# Patient Record
Sex: FEMALE | Race: WHITE | NOT HISPANIC OR LATINO | Employment: FULL TIME | URBAN - METROPOLITAN AREA
[De-identification: names, ages, dates, MRNs, and addresses within clinical notes are randomized per-mention and may not be internally consistent; named-entity substitution may affect disease eponyms.]

---

## 2018-03-06 PROBLEM — M23.204 DEGENERATIVE TEAR OF MEDIAL MENISCUS OF LEFT KNEE: Status: ACTIVE | Noted: 2018-03-06

## 2018-03-22 ENCOUNTER — CLINICAL SUPPORT (OUTPATIENT)
Dept: REHABILITATION | Facility: HOSPITAL | Age: 53
End: 2018-03-22
Payer: COMMERCIAL

## 2018-03-22 DIAGNOSIS — M25.562 ACUTE PAIN OF LEFT KNEE: ICD-10-CM

## 2018-03-22 DIAGNOSIS — M62.81 MUSCLE WEAKNESS: ICD-10-CM

## 2018-03-22 PROCEDURE — 97110 THERAPEUTIC EXERCISES: CPT | Mod: PN

## 2018-03-22 PROCEDURE — 97161 PT EVAL LOW COMPLEX 20 MIN: CPT | Mod: PN

## 2018-03-22 PROCEDURE — 97112 NEUROMUSCULAR REEDUCATION: CPT | Mod: PN

## 2018-03-22 NOTE — PLAN OF CARE
Physical Therapy Evaluation    Name: Arlin Junior  Clinic Number: [id number]        Diagnosis:   Encounter Diagnoses   Name Primary?    Muscle weakness     Acute pain of left knee      Physician: Marce Nevarez MD  Treatment Orders: PT Eval and Treat    Past Medical History:   Diagnosis Date    Arthritis     Degenerative tear of meniscus of left knee 02/2018    History of patent ductus arteriosus as a child      Current Outpatient Prescriptions   Medication Sig    hydrocodone-acetaminophen 10-325mg (NORCO)  mg Tab Take 1 tablet by mouth every 4 (four) hours as needed (pain).    meloxicam (MOBIC) 15 MG tablet TAKE 1 TABLET(15 MG) BY MOUTH EVERY DAY    ondansetron (ZOFRAN-ODT) 4 MG TbDL Take 1 tablet (4 mg total) by mouth every 8 (eight) hours as needed.     No current facility-administered medications for this visit.      Review of patient's allergies indicates:   Allergen Reactions    Codeine Nausea And Vomiting    Neosporin [benzalkonium chloride]     Penicillins      Childhood reaction     Precautions: standard    Evaluation Date: 3/22/18        Subjective       Onset Date: L partial med menisectomy on 3/6/18.  Pt states she was having L knee pain for at least 1 yr before sx.  Pt had injections prior to sx.  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 female that presents to [facility] secondary to L knee pain.       Pain: current 4/10, worst 7/10, best 0/10, (anterior/medial L knee )  Radicular symptoms: none  Aggravating factors: prolonged standing/walking  Easing factors: ice, rest    Pts goals: return to work and exercising at the [employer]    No cultural, environmental, or spiritual barriers identified to treatment or learning.        Objective     Observation: pt is pleasant and cooperative    Posture: upright  posture      Range of Motion:   Knee Left active Right Active   Flexion 128 139   Extension 0 5 hyper ext       Lower Extremity Strength  Right LE  Left LE    Knee extension: 5/5 Knee extension: 4+/5   Knee flexion: 5/5 Knee flexion: 5/5   Hip flexion: 5/5 Hip flexion: 4+/5   Hip extension:  5/5 Hip extension: 4+/5   Hip abduction: 4/5 Hip abduction: 4/5   Hip adduction: 5/5 Hip adduction 4+/5   Ankle dorsiflexion: 5/5 Ankle dorsiflexion: 5/5       Function:  - SLS R: 8 sec  - SLS L: 3 sec    Joint Mobility:  Patellar mobility: R: WNL in all planes, L: slight decreased mobility superior/inferiorly    Sensation: grossly intact to light touch      Edema: min edema to L knee    Functional Limitations Reports - G Codes  Category: mobility  CMS Impairment/Limitation/Restriction for FOTO Knee Survey  Status Limitation G-Code CMS Severity Modifier  Intake 28% 72% Current Status CL - At least 60 percent but less than 80 percent  Predicted 54% 46% Goal Status+ CK - At least 40 percent but less than 60 percent        PT Evaluation Completed? Yes  Discussed Plan of Care with patient: Yes    TREATMENT:  Pt instructed in and performed neuromuscular re-ed x 8min (Brazilian, 10/10) while performing QS (1/2 roll under ankle and strap for gastroc stretch)    Arlin received therapeutic exercises to develop strength and endurance, flexibility for 10 minutes including:  Stationary bike x5 min level 1, seat 5  SLR x10  SL hip abd/ext x10  SL clams x10  HS stretch in sitting 3x20 sec  Pt ascended/descended 4 steps with 1 rail x3 trials    Pt rec'd gait training x 3min: pt ambulated 200 ft on striped walkway with metronome at 95bpm (vc for L heel strike and L knee ext with L stance).  Pt instructed to ambulate with metronome on her phone at home.  Pt gave verbal understanding.    Pt rec'd patella mobs (superior/inferior) x 2 min    Pt. Received cold pack x 10 min. To L knee. Following treatment.    HEP provided: pt given verbal and written  instruction for all ex listed above  Instructed pt. Regarding: Proper technique with all exercises. Pt demo good understanding of the education provided. Arlin demonstrated good return demonstration of activities.      Assessment     This is a 53 y.o. female referred to outpatient physical therapy and presents with a medical diagnosis of Degenerative tear of medial meniscus of left knee, Primary osteoarthritis of left knee and demonstrates limitations as described in the problem list. Pt will benefit from physcial therapy services in order to maximize pain free and/or functional use of left knee. The following goals were discussed with the patient and patient is in agreement with them as to be addressed in the treatment plan.     History  Co-morbidities and personal factors that may impact the plan of care Examination  Body Structures and Functions, activity limitations and participation restrictions that may impact the plan of care    Clinical Presentation   Co-morbidities:   prior ankle sx        Personal Factors:   no deficits Body Regions:   lower extremities    Body Systems:    ROM  strength  balance  gait            Participation Restrictions:   Working, prolonged standing/walking     Activity limitations:   Learning and applying knowledge  no deficits    General Tasks and Commands  no deficits    Communication  no deficits    Mobility  lifting and carrying objects  walking    Self care  no deficits    Domestic Life  shopping  cooking  doing house work (cleaning house, washing dishes, laundry)    Interactions/Relationships  no deficits    Life Areas  no deficits    Community and Social Life  community life  recreation and leisure         stable and uncomplicated                      low      Decision Making/ Complexity Score:  low         Medical necessity is demonstrated by the following IMPAIRMENTS/PROBLEM LIST:   1)Increase in L knee pain level limiting function   2)decreased L knee ROM   3)decreased LE  strength   4)gait abnormality   5)Lack of HEP    GOALS:   Short Term Goals:  4 weeks  1. Pt will report decreased L knee pain to 3/10 in order to increase tolerance for standing/walking.  2. Pt will increase L knee extension ROM to 2 deg hyperextension in order to show improved functional mobility.  3. Pt will increase L knee flexion ROM by at least 5 degrees in order to show improved functional mobility.  4. Increased L SLS balance to 5sec for increased ease with ambulation  5. Pt will be independent and consistent with issued HEP in order to show carryover between therapy sessions.  6. Pt will ambulate 300 ft without AD independently without significant gait deviation    Long Term Goals: 8 weeks   7. Pt will report decreased L knee pain to 1/10 in order to increase tolerance for standing/walking.  8. Pt will report returning to working full shifts at [employer] without significant pain.  9. Pt will increase L knee flexion ROM to 135-140 degrees in order to show improved functional mobility.  10. Increased L SLS balance to 10sec for increased ease with ambulation  11. Pt will ascend/descend 12 steps without rails independently  12. Pt will perform squat lift without pain to L knee          Plan     Pt will be treated by physical therapy 1-3 times a week for 8 weeks for Pt Education, HEP, therapeutic exercises, neuromuscular re-education, joint mobilizations, dry needling, modalities prn to achieve established goals. Arlin may at times be seen by a PTA as part of the Rehab Team.     Cont PT for  8         weeks.         I certify the need for these services furnished under this plan of treatment and while under my care.______________________________ Physician/Referring Practitioner  Date of Signature

## 2018-03-22 NOTE — PROGRESS NOTES
Physical Therapy Evaluation    Name: Arlin Junior  Clinic Number: [id number]        Diagnosis:   Encounter Diagnoses   Name Primary?    Muscle weakness     Acute pain of left knee      Physician: Marce Nevarez MD  Treatment Orders: PT Eval and Treat    Past Medical History:   Diagnosis Date    Arthritis     Degenerative tear of meniscus of left knee 02/2018    History of patent ductus arteriosus as a child      Current Outpatient Prescriptions   Medication Sig    hydrocodone-acetaminophen 10-325mg (NORCO)  mg Tab Take 1 tablet by mouth every 4 (four) hours as needed (pain).    meloxicam (MOBIC) 15 MG tablet TAKE 1 TABLET(15 MG) BY MOUTH EVERY DAY    ondansetron (ZOFRAN-ODT) 4 MG TbDL Take 1 tablet (4 mg total) by mouth every 8 (eight) hours as needed.     No current facility-administered medications for this visit.      Review of patient's allergies indicates:   Allergen Reactions    Codeine Nausea And Vomiting    Neosporin [benzalkonium chloride]     Penicillins      Childhood reaction     Precautions: standard    Evaluation Date: 3/22/18        Subjective       Onset Date: L partial med menisectomy on 3/6/18.  Pt states she was having L knee pain for at least 1 yr before sx.  Pt had injections prior to sx.  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 female that presents to [facility] secondary to L knee pain.       Pain: current 4/10, worst 7/10, best 0/10, (anterior/medial L knee )  Radicular symptoms: none  Aggravating factors: prolonged standing/walking  Easing factors: ice, rest    Pts goals: return to work and exercising at the [facility] [employer]    No cultural, environmental, or spiritual barriers identified to treatment or learning.        Objective     Observation: pt is pleasant and cooperative    Posture: upright  posture      Range of Motion:   Knee Left active Right Active   Flexion 128 139   Extension 0 5 hyper ext       Lower Extremity Strength  Right LE  Left LE    Knee extension: 5/5 Knee extension: 4+/5   Knee flexion: 5/5 Knee flexion: 5/5   Hip flexion: 5/5 Hip flexion: 4+/5   Hip extension:  5/5 Hip extension: 4+/5   Hip abduction: 4/5 Hip abduction: 4/5   Hip adduction: 5/5 Hip adduction 4+/5   Ankle dorsiflexion: 5/5 Ankle dorsiflexion: 5/5       Function:  - SLS R: 8 sec  - SLS L: 3 sec    Joint Mobility:  Patellar mobility: R: WNL in all planes, L: slight decreased mobility superior/inferiorly    Sensation: grossly intact to light touch      Edema: min edema to L knee    Functional Limitations Reports - G Codes  Category: mobility  CMS Impairment/Limitation/Restriction for FOTO Knee Survey  Status Limitation G-Code CMS Severity Modifier  Intake 28% 72% Current Status CL - At least 60 percent but less than 80 percent  Predicted 54% 46% Goal Status+ CK - At least 40 percent but less than 60 percent        PT Evaluation Completed? Yes  Discussed Plan of Care with patient: Yes    TREATMENT:  Pt instructed in and performed neuromuscular re-ed x 8min (Northern Irish, 10/10) while performing QS (1/2 roll under ankle and strap for gastroc stretch)    Arlin received therapeutic exercises to develop strength and endurance, flexibility for 10 minutes including:  Stationary bike x5 min level 1, seat 5  SLR x10  SL hip abd/ext x10  SL clams x10  HS stretch in sitting 3x20 sec  Pt ascended/descended 4 steps with 1 rail x3 trials    Pt rec'd gait training x 3min: pt ambulated 200 ft on striped walkway with metronome at 95bpm (vc for L heel strike and L knee ext with L stance).  Pt instructed to ambulate with metronome on her phone at home.  Pt gave verbal understanding.    Pt rec'd patella mobs (superior/inferior) x 2 min    Pt. Received cold pack x 10 min. To L knee. Following treatment.    HEP provided: pt given verbal and written  instruction for all ex listed above  Instructed pt. Regarding: Proper technique with all exercises. Pt demo good understanding of the education provided. Arlin demonstrated good return demonstration of activities.      Assessment     This is a 53 y.o. female referred to outpatient physical therapy and presents with a medical diagnosis of Degenerative tear of medial meniscus of left knee, Primary osteoarthritis of left knee and demonstrates limitations as described in the problem list. Pt will benefit from physcial therapy services in order to maximize pain free and/or functional use of left knee. The following goals were discussed with the patient and patient is in agreement with them as to be addressed in the treatment plan.     History  Co-morbidities and personal factors that may impact the plan of care Examination  Body Structures and Functions, activity limitations and participation restrictions that may impact the plan of care    Clinical Presentation   Co-morbidities:   prior ankle sx        Personal Factors:   no deficits Body Regions:   lower extremities    Body Systems:    ROM  strength  balance  gait            Participation Restrictions:   Working, prolonged standing/walking     Activity limitations:   Learning and applying knowledge  no deficits    General Tasks and Commands  no deficits    Communication  no deficits    Mobility  lifting and carrying objects  walking    Self care  no deficits    Domestic Life  shopping  cooking  doing house work (cleaning house, washing dishes, laundry)    Interactions/Relationships  no deficits    Life Areas  no deficits    Community and Social Life  community life  recreation and leisure         stable and uncomplicated                      low      Decision Making/ Complexity Score:  low         Medical necessity is demonstrated by the following IMPAIRMENTS/PROBLEM LIST:   1)Increase in L knee pain level limiting function   2)decreased L knee ROM   3)decreased LE  strength   4)gait abnormality   5)Lack of HEP    GOALS:   Short Term Goals:  4 weeks  1. Pt will report decreased L knee pain to 3/10 in order to increase tolerance for standing/walking.  2. Pt will increase L knee extension ROM to 2 deg hyperextension in order to show improved functional mobility.  3. Pt will increase L knee flexion ROM by at least 5 degrees in order to show improved functional mobility.  4. Increased L SLS balance to 5sec for increased ease with ambulation  5. Pt will be independent and consistent with issued HEP in order to show carryover between therapy sessions.  6. Pt will ambulate 300 ft without AD independently without significant gait deviation    Long Term Goals: 8 weeks   7. Pt will report decreased L knee pain to 1/10 in order to increase tolerance for standing/walking.  8. Pt will report returning to working full shifts at [employer] without significant pain.  9. Pt will increase L knee flexion ROM to 135-140 degrees in order to show improved functional mobility.  10. Increased L SLS balance to 10sec for increased ease with ambulation  11. Pt will ascend/descend 12 steps without rails independently  12. Pt will perform squat lift without pain to L knee          Plan     Pt will be treated by physical therapy 1-3 times a week for 8 weeks for Pt Education, HEP, therapeutic exercises, neuromuscular re-education, joint mobilizations, dry needling, modalities prn to achieve established goals. Arlin may at times be seen by a PTA as part of the Rehab Team.     Cont PT for  8         weeks.         I certify the need for these services furnished under this plan of treatment and while under my care.______________________________ Physician/Referring Practitioner  Date of Signature

## 2018-03-26 ENCOUNTER — CLINICAL SUPPORT (OUTPATIENT)
Dept: REHABILITATION | Facility: HOSPITAL | Age: 53
End: 2018-03-26
Payer: COMMERCIAL

## 2018-03-26 DIAGNOSIS — M62.81 MUSCLE WEAKNESS: ICD-10-CM

## 2018-03-26 DIAGNOSIS — M25.562 ACUTE PAIN OF LEFT KNEE: ICD-10-CM

## 2018-03-26 DIAGNOSIS — M23.204 DEGENERATIVE TEAR OF MEDIAL MENISCUS OF LEFT KNEE: ICD-10-CM

## 2018-03-26 PROCEDURE — 97110 THERAPEUTIC EXERCISES: CPT | Mod: PN

## 2018-03-26 NOTE — PATIENT INSTRUCTIONS
Heel Raises        Stand with support like your kitchen counter. Tighten pelvic floor and hold. With knees straight, raise heels off ground. Allow 2 seconds to raises heels and 2 seconds to lower heels.  Repeat 10 times for 2 sets. Do 1 times a day.  Copyright © [employer]. All rights reserved.     Quad Strength: Terminal Knee Extension With Tubing        With tubing behind involved knee or just above. Sueeze buttocks and use thigh muscles to straighten knee, pressing heel into ground. Hold 5 seconds. Relax and repeat.  Repeat 10 times or for 2 sets. Do 1-2 sessions per day.   https://[employer].SERPs/22   Copyright © [employer]. All rights reserved.     Buttocks        Lying face down, tighten front of thigh muscle. Keeping leg straight, lift leg just off floor. Hold 1 count. Slowly return to starting position.  Repeat 10 times each leg. Do 2 sets per session. Do 1 sessions per day.  Copyright © [employer]. All rights reserved.

## 2018-03-26 NOTE — PROGRESS NOTES
Name: Arlin Junior  Clinic Number: [id number]  Date of Treatment: 03/26/2018   Diagnosis:   Encounter Diagnoses   Name Primary?    Muscle weakness     Acute pain of left knee     Degenerative tear of medial meniscus of left knee        Visit number: 2  Start date: 3/22/2018  POC End date: 5/17/2018    Time in: 11:00  Time Out: 12:11  Total Treatment Time: 60  1:1 Time: 60    Precautions: L partial med menisectomy on 3/6/18    Subjective:    Arlin reports improvement of symptoms to overall to the L knee yet continues to have pain, swelling, and stiffness (worst in the mornings). Was working about 3 hours yesterday at work with mostly standing and walking, which caused increased pain and required her to take a pain pill to help her sleep last night. Pain last night was 7/10 when going to bed, 4/10 when waking up. Patient reports their current pain to be 1/10 on a 0-10 scale with 0 being no pain and 10 being the worst pain imaginable.    Objective    Arlin received therapeutic exercises to develop strength, endurance, ROM and flexibility for 55 minutes including:     Stationary bike x5 min level 2, seat 5  Sup quad set with towel under ankle 10 x 5 sec  SLR x10  SL hip abd/ext 2x10 each  SL clams 2x10 each  Prone SLR 2x10  HS stretch in sitting 3x20 sec (L)  Standing heel raises (B) 2x10  Pt ascended/descended 4 steps with 1 rail x3 trials  Fwd step up with R hip/knee flexion x 10   Gastroc stretch on incline 3x20 sec     Gait: pt ambulated 200 ft on striped walkway without metronome (vc for L heel strike and L knee ext with L stance).  Pt instructed to continue ambulating with metronome on her phone at home.  Pt gave verbal understanding.     Pt rec'd patella mobs (superior/inferior, medial), MFR to medial and lateral fat pads as well x 5 min     Pt. Received cold pack x 10 min. To L knee. Following treatment for pain and inflammation     HEP provided: pt given verbal and written instruction for additional standing heel  raises, standing TKE with green band, and prone SLR. Pt given green band for home. See in patient instructions under Notes tab. Patient also instructed to contact MD regarding possibility for limitations upon returning to work.   Instructed pt. Regarding: Proper technique with all exercises. Pt demo good understanding of the education provided. Arlin demonstrated good return demonstration of activities.    Assessment:     Arlin tolerated treatment well overall. She presented upon arrival with limited L knee extension, which improved with manual techniques and quad sets. Verbal cues throughout session with WB activity to maintain full L knee extension in stance phase. Quad and hip weakness of L LE noted. Mild onset of discomfort into L knee near lateral fat pad during ambulation, relieved with rest.  Pt will continue to benefit from skilled PT intervention to address the remaining functional deficits, maximize pt's level of independence in the home and community environment, as well as Continued inability to participate in vocational pursuits, Pain limits function of effected part for some activities, Unable to participate fully in daily activities and Weakness.    Patient is making good progress towards established goals.    Short Term Goals:  4 weeks  1. Pt will report decreased L knee pain to 3/10 in order to increase tolerance for standing/walking.  2. Pt will increase L knee extension ROM to 2 deg hyperextension in order to show improved functional mobility.  3. Pt will increase L knee flexion ROM by at least 5 degrees in order to show improved functional mobility.  4. Increased L SLS balance to 5sec for increased ease with ambulation  5. Pt will be independent and consistent with issued HEP in order to show carryover between therapy sessions.  6. Pt will ambulate 300 ft without AD independently without significant gait deviation     Long Term Goals: 8 weeks       7. Pt will report decreased L knee pain to 1/10 in  order to increase tolerance for standing/walking.  8. Pt will report returning to working full shifts at [employer] without significant pain.  9. Pt will increase L knee flexion ROM to 135-140 degrees in order to show improved functional mobility.  10. Increased L SLS balance to 10sec for increased ease with ambulation  11. Pt will ascend/descend 12 steps without rails independently  12. Pt will perform squat lift without pain to L knee    Plan:  Continue with established Plan of Care towards PT goals.

## 2018-03-28 ENCOUNTER — CLINICAL SUPPORT (OUTPATIENT)
Dept: REHABILITATION | Facility: HOSPITAL | Age: 53
End: 2018-03-28
Payer: COMMERCIAL

## 2018-03-28 DIAGNOSIS — M23.204 DEGENERATIVE TEAR OF MEDIAL MENISCUS OF LEFT KNEE: ICD-10-CM

## 2018-03-28 DIAGNOSIS — M25.562 ACUTE PAIN OF LEFT KNEE: ICD-10-CM

## 2018-03-28 DIAGNOSIS — M62.81 MUSCLE WEAKNESS: ICD-10-CM

## 2018-03-28 PROCEDURE — 97110 THERAPEUTIC EXERCISES: CPT | Mod: PN

## 2018-03-28 NOTE — PROGRESS NOTES
Name: Arlin Junior  Clinic Number: [id number]  Date of Treatment: 03/28/2018   Diagnosis:   Encounter Diagnoses   Name Primary?    Muscle weakness     Acute pain of left knee     Degenerative tear of medial meniscus of left knee          Start date: 3/22/2018  POC End date: 5/17/2018    Time in: 11:00  Time Out: 12:11  Total Treatment Time: 60  1:1 Time: 60  Visit number: 3    Precautions: L partial med menisectomy on 3/6/18    Subjective:    Arlin reports  3/10 to L knee on a 0-10 scale with 0 being no pain and 10 being the worst pain imaginable.  Pt states she's been compliant with HeP and ice.  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  Her pain is worst at night.    Objective  L knee ext: 0 deg prior to tx    Arlin received therapeutic exercises to develop strength, endurance, ROM and flexibility for 55 minutes including:   NP Stationary bike x5 min level 2, seat 5  Sup quad set with towel under ankle 10 x 5 sec  SLR x10  SL hip abd/ext 2x10 each  SL clams 2x10 each YTB  Prone hip ext 2x10  HS stretch in sitting 3x20 sec (L)  Standing heel raises (B) 2x10  Pt ascended/descended 4 steps with 1 rail x3 trials  Fwd step up with R hip/knee flexion x 10   Gastroc stretch on incline 3x20 sec  ADDED:  Hip abd walk on striped walkway Y sport loop 1 lap  Lateral step downs 4in 2x10 (slight pain to area of fat pad)  Shuttle 4 bands 2x10  2:1 shuttle 2.5 bands 2x10  Mini squats 2x10 (vc to avoid knee past toe)     Gait: pt ambulated 200 ft on striped walkway without metronome (vc for L heel strike and L knee ext with L stance).  Pt instructed to continue ambulating with metronome on her phone at home.  Pt gave verbal understanding.     Pt rec'd patella mobs (superior/inferior, medial), MFR to medial and lateral fat pads as well x 5 min     Pt. Received cold pack x 10 min. To L knee. Following treatment for pain and inflammation     HEP provided: pt instructed to continue previous HeP and given YTB to  kayla todd.  Instructed pt. Regarding: Proper technique with all exercises. Pt demo good understanding of the education provided. Arlin demonstrated good return demonstration of activities.    Assessment:     Arlin tolerated treatment well reporting 0/10 pain to L knee post tx.  She was able to tolerate increased quad strengthening intensity.  She did report some pain to area of L knee fat pad with lateral step downs, which was relieved with rest and MFR to fat pads.  She presented with good L knee extension, but would like to see her regain some hyperextension for symmetry to R knee.  Pt will continue to benefit from skilled PT intervention to address the remaining functional deficits, maximize pt's level of independence in the home and community environment, as well as Continued inability to participate in vocational pursuits, Pain limits function of effected part for some activities, Unable to participate fully in daily activities and Weakness.    Patient is making good progress towards established goals.    Short Term Goals:  4 weeks  1. Pt will report decreased L knee pain to 3/10 in order to increase tolerance for standing/walking.  2. Pt will increase L knee extension ROM to 2 deg hyperextension in order to show improved functional mobility.  3. Pt will increase L knee flexion ROM by at least 5 degrees in order to show improved functional mobility.  4. Increased L SLS balance to 5sec for increased ease with ambulation  5. Pt will be independent and consistent with issued HEP in order to show carryover between therapy sessions.  6. Pt will ambulate 300 ft without AD independently without significant gait deviation     Long Term Goals: 8 weeks       7. Pt will report decreased L knee pain to 1/10 in order to increase tolerance for standing/walking.  8. Pt will report returning to working full shifts at [employer] without significant pain.  9. Pt will increase L knee flexion ROM to 135-140 degrees in  order to show improved functional mobility.  10. Increased L SLS balance to 10sec for increased ease with ambulation  11. Pt will ascend/descend 12 steps without rails independently  12. Pt will perform squat lift without pain to L knee    Plan:  Continue with established Plan of Care towards PT goals.

## 2018-04-02 ENCOUNTER — CLINICAL SUPPORT (OUTPATIENT)
Dept: REHABILITATION | Facility: HOSPITAL | Age: 53
End: 2018-04-02
Payer: COMMERCIAL

## 2018-04-02 DIAGNOSIS — M62.81 MUSCLE WEAKNESS: ICD-10-CM

## 2018-04-02 DIAGNOSIS — M23.204 DEGENERATIVE TEAR OF MEDIAL MENISCUS OF LEFT KNEE: ICD-10-CM

## 2018-04-02 DIAGNOSIS — M25.562 ACUTE PAIN OF LEFT KNEE: ICD-10-CM

## 2018-04-02 PROCEDURE — 97110 THERAPEUTIC EXERCISES: CPT | Mod: PN

## 2018-04-02 NOTE — PROGRESS NOTES
Name: Arlin Junior  Clinic Number: [id number]  Date of Treatment: 04/02/2018   Diagnosis:   Encounter Diagnoses   Name Primary?    Muscle weakness     Acute pain of left knee     Degenerative tear of medial meniscus of left knee          Start date: 3/22/2018  POC End date: 5/17/2018    Time in: 11:03  Time Out: 12:10  Total Treatment Time: 55  1:1 Time: 55  Visit number: 4    Precautions: L partial med menisectomy on 3/6/18    Subjective:    Arlin reports having increased pain and muscle soreness following previous PT session. Had some soreness this weekend, but it improved with rest. Has been compliant with her home exercises as well as icing her L knee at least 3 times a day. She reports 2/10 to L knee currently on a 0-10 scale with 0 being no pain and 10 being the worst pain imaginable.     Objective  L knee ext: 0 deg prior to tx    Arlin received therapeutic exercises to develop strength, endurance, ROM and flexibility for 55 minutes including:   NP Stationary bike x5 min level 2, seat 5  Sup quad set with towel under ankle 10 x 5 sec  SLR 2x10  SL hip abd/ext 2x10 each  SL clams 2x10 each YTB  Prone hip ext 2x10  HS stretch in sitting 3x20 sec (L)  Standing heel raises (B) 2x10  Pt ascended/descended 4 steps with 0 rail x3 trials  Fwd step up with R hip/knee flexion 2 x 10   Gastroc stretch on incline 3x20 sec  Hip abd walk on striped walkway Y sport loop 1 lap  Lateral step downs Bottom step x10 (vc to prevent valgus, which eliminates pain into fat pad)  Shuttle 4 bands 2x10  2:1 shuttle 2.5 bands 2x10  Mini squats 2x10 (vc to avoid knee past toe)     Gait: pt ambulated 100 ft (vc for slower pace, L heel strike and L knee ext with L stance).  Pt instructed to continue ambulating with metronome on her phone at home.  Pt gave verbal understanding.     Pt rec'd patella mobs (superior/inferior, medial), MFR to medial and lateral fat pads as well x 5 min     Pt. Received cold pack x 10 min to L knee. Following  treatment for pain and inflammation     HEP provided: pt instructed to continue previous HeP and given YTB to progress clams.  Instructed pt. Regarding: Proper technique with all exercises. Pt demo good understanding of the education provided. Arlin demonstrated good return demonstration of activities.    Assessment:     Arlin demonstrated overall good tolerance to treatment this date without increased pain. Mild distal ITB tightness palpated with manual techniques, relief after manual tx. Improved stability with shuttle press, forward and lateral step downs, and mini squats. Verbal cues required to maintain body mechanics and avoid knees over toes as well as slight valgus. She reported 0-1/10 pain to L knee post tx.   Pt will continue to benefit from skilled PT intervention to address the remaining functional deficits, maximize pt's level of independence in the home and community environment, as well as Continued inability to participate in vocational pursuits, Pain limits function of effected part for some activities, Unable to participate fully in daily activities and Weakness.    Patient is making good progress towards established goals.    Short Term Goals:  4 weeks  1. Pt will report decreased L knee pain to 3/10 in order to increase tolerance for standing/walking.  2. Pt will increase L knee extension ROM to 2 deg hyperextension in order to show improved functional mobility.  3. Pt will increase L knee flexion ROM by at least 5 degrees in order to show improved functional mobility.  4. Increased L SLS balance to 5sec for increased ease with ambulation  5. Pt will be independent and consistent with issued HEP in order to show carryover between therapy sessions.  6. Pt will ambulate 300 ft without AD independently without significant gait deviation     Long Term Goals: 8 weeks       7. Pt will report decreased L knee pain to 1/10 in order to increase tolerance for standing/walking.  8. Pt will report returning to  working full shifts at [employer] without significant pain.  9. Pt will increase L knee flexion ROM to 135-140 degrees in order to show improved functional mobility.  10. Increased L SLS balance to 10sec for increased ease with ambulation  11. Pt will ascend/descend 12 steps without rails independently  12. Pt will perform squat lift without pain to L knee    Plan:  Continue with established Plan of Care towards PT goals.

## 2018-04-05 ENCOUNTER — CLINICAL SUPPORT (OUTPATIENT)
Dept: REHABILITATION | Facility: HOSPITAL | Age: 53
End: 2018-04-05
Payer: COMMERCIAL

## 2018-04-05 DIAGNOSIS — M25.562 ACUTE PAIN OF LEFT KNEE: ICD-10-CM

## 2018-04-05 DIAGNOSIS — M23.204 DEGENERATIVE TEAR OF MEDIAL MENISCUS OF LEFT KNEE: ICD-10-CM

## 2018-04-05 DIAGNOSIS — M62.81 MUSCLE WEAKNESS: ICD-10-CM

## 2018-04-05 PROCEDURE — 97110 THERAPEUTIC EXERCISES: CPT | Mod: PN

## 2018-04-05 NOTE — PROGRESS NOTES
Name: Arlin Junior  Clinic Number: [id number]  Date of Treatment: 04/05/2018   Diagnosis:   Encounter Diagnoses   Name Primary?    Muscle weakness     Acute pain of left knee     Degenerative tear of medial meniscus of left knee          Start date: 3/22/2018  POC End date: 5/17/2018    Time in: 1:00  Time Out: 2:10  Total Treatment Time: 55  1:1 Time: 55  Visit number: 5    Precautions: L partial med menisectomy on 3/6/18    Subjective:    Arlin reports having increased pain and muscle soreness following previous PT session. . She reports 3/10 to L knee currently on a 0-10 scale with 0 being no pain and 10 being the worst pain imaginable.     Objective  L knee ext: 0 deg prior to tx    Arlin received therapeutic exercises to develop strength, endurance, ROM and flexibility for 55 minutes including:   NP Stationary bike x5 min level 2, seat 5  Seated quad set with 1/2 roll under ankle and strap for gastroc stretch 20 x 5 sec  SLR 2x10  SL hip abd/ext 2x10 each NP  SL clams 2x10 each YTB  Prone hip ext 2x10  HS stretch in sitting 3x20 sec (L) NP  Standing heel raises (B) 2x10  Pt ascended/descended 4 steps with 0 rail x3 trials  Fwd step up with R hip/knee flexion 6in 2 x 10   Gastroc stretch on incline 3x20 sec  Hip abd walk on striped walkway Y sport loop 1 lap  Lateral step downs 4in step x10 pt reporting pain to medial fat pad even with cues to avoid valgus  Shuttle 4 bands 2x10 (Y sport loop for hip abd iso)  2:1 shuttle 3 bands 2x10  Mini squats 2x10 with 1# dowel overhead and stool in front of her to prevent knees past toes     Gait: pt ambulated 100 ft (vc for slower pace, L heel strike and L knee ext with L stance).    Pt ascended/descended 4 steps with 1 rail, reciprocal pattern mod I x4 trials without pain     Pt rec'd patella mobs (superior/inferior, medial), MFR to medial and lateral fat pads as well x 5 min     Pt. Received cold pack x 10 min to L knee. Following treatment for pain and  inflammation     HEP provided: pt instructed to continue previous HeP.  Instructed pt. Regarding: Proper technique with all exercises. Pt demo good understanding of the education provided. Arlin demonstrated good return demonstration of activities.    Assessment:     Arlin tolerated tx session well reporting 0/10 pain to L knee post tx.  She continues to progress with increased strength as noted by performing shuttle with increased resistance and without pain.  Pt continues to have difficulty with lateral step downs due to decreased eccentric quad control, but she was able to tolerate increased 2:1 on shuttle without pain.  Pt will continue to benefit from skilled PT intervention to address the remaining functional deficits, maximize pt's level of independence in the home and community environment, as well as Continued inability to participate in vocational pursuits, Pain limits function of effected part for some activities, Unable to participate fully in daily activities and Weakness.    Patient is making good progress towards established goals.    Short Term Goals:  4 weeks  1. Pt will report decreased L knee pain to 3/10 in order to increase tolerance for standing/walking.  2. Pt will increase L knee extension ROM to 2 deg hyperextension in order to show improved functional mobility.  3. Pt will increase L knee flexion ROM by at least 5 degrees in order to show improved functional mobility.  4. Increased L SLS balance to 5sec for increased ease with ambulation  5. Pt will be independent and consistent with issued HEP in order to show carryover between therapy sessions.  6. Pt will ambulate 300 ft without AD independently without significant gait deviation     Long Term Goals: 8 weeks       7. Pt will report decreased L knee pain to 1/10 in order to increase tolerance for standing/walking.  8. Pt will report returning to working full shifts at [employer] without significant pain.  9. Pt will increase L knee  flexion ROM to 135-140 degrees in order to show improved functional mobility.  10. Increased L SLS balance to 10sec for increased ease with ambulation  11. Pt will ascend/descend 12 steps without rails independently  12. Pt will perform squat lift without pain to L knee    Plan:  Continue with established Plan of Care towards PT goals.

## 2018-04-09 ENCOUNTER — CLINICAL SUPPORT (OUTPATIENT)
Dept: REHABILITATION | Facility: HOSPITAL | Age: 53
End: 2018-04-09
Payer: COMMERCIAL

## 2018-04-09 DIAGNOSIS — M23.204 DEGENERATIVE TEAR OF MEDIAL MENISCUS OF LEFT KNEE: ICD-10-CM

## 2018-04-09 DIAGNOSIS — M25.562 ACUTE PAIN OF LEFT KNEE: ICD-10-CM

## 2018-04-09 DIAGNOSIS — M62.81 MUSCLE WEAKNESS: ICD-10-CM

## 2018-04-09 PROCEDURE — 97110 THERAPEUTIC EXERCISES: CPT | Mod: PN

## 2018-04-09 NOTE — PROGRESS NOTES
Name: Arlin Junior  Clinic Number: [id number]  Date of Treatment: 04/09/2018   Diagnosis:   Encounter Diagnoses   Name Primary?    Muscle weakness     Acute pain of left knee     Degenerative tear of medial meniscus of left knee          Start date: 3/22/2018  POC End date: 5/17/2018    Time in: 12:00  Time Out: 1:00  Total Treatment Time: 55  1:1 Time: 55  Visit number: 6    Precautions: L partial med menisectomy on 3/6/18    Subjective:    She reports 2/10 to L knee currently on a 0-10 scale with 0 being no pain and 10 being the worst pain imaginable. She states she goes back to work today.    Objective  L knee ext: 0 deg prior to tx    Arlin  Instructed in and perfomed therapeutic exercises to develop strength, endurance, ROM and flexibility for 55 minutes including:   NP Stationary bike x5 min level 2, seat 5  Seated quad set with 1/2 roll under ankle and strap for gastroc stretch 20 x 5 sec  SLR 2x10 1#  SL hip abd/ext 2x10 each NP  SL clams 2x10 each Y sport loop  Prone hip ext 2x10  HS stretch in sitting 3x20 sec (L) NP  Standing heel raises (SL) 2x10  Pt ascended/descended 4 steps with 0 rail x3 trials NP  Fwd step up with R hip/knee flexion 6in 2 x 10   Gastroc stretch on incline 3x20 sec  Hip abd walk on striped walkway Y sport loop 1 lap  Lateral step downs 4in step 2x10   Shuttle 4 bands 2x10 (Y sport loop for hip abd iso)  2:1 shuttle 3 bands 2x10  Mini squats 2x10 with 1# dowel overhead and stool in front of her to prevent knees past toes   ADDED:  Donkey kicks x10 ea    Gait: pt ambulated on striped walkway for 3 min with metronome on 92bpm (vc for L heel strike and equal stance time--improved with metronome)     Pt rec'd patella mobs (superior/inferior, medial), MFR to medial and lateral fat pads as well x 5 min     Pt. Received cold pack x 10 min to L knee. Following treatment for pain and inflammation     HEP provided: pt instructed to continue previous HeP.  Instructed pt. Regarding: Proper  technique with all exercises. Pt demo good understanding of the education provided. Arlin demonstrated good return demonstration of activities.    Assessment:     Arlin tolerated tx session well reporting 0/10 pain to L knee post tx.  Pt was able to tolerate increased reps for lateral step downs with improve eccentric quad control noted.  Pt will continue to benefit from skilled PT intervention to address the remaining functional deficits, maximize pt's level of independence in the home and community environment, as well as Continued inability to participate in vocational pursuits, Pain limits function of effected part for some activities, Unable to participate fully in daily activities and Weakness.    Patient is making good progress towards established goals.    Short Term Goals:  4 weeks  1. Pt will report decreased L knee pain to 3/10 in order to increase tolerance for standing/walking.  2. Pt will increase L knee extension ROM to 2 deg hyperextension in order to show improved functional mobility.  3. Pt will increase L knee flexion ROM by at least 5 degrees in order to show improved functional mobility.  4. Increased L SLS balance to 5sec for increased ease with ambulation  5. Pt will be independent and consistent with issued HEP in order to show carryover between therapy sessions.  6. Pt will ambulate 300 ft without AD independently without significant gait deviation     Long Term Goals: 8 weeks       7. Pt will report decreased L knee pain to 1/10 in order to increase tolerance for standing/walking.  8. Pt will report returning to working full shifts at [employer] without significant pain.  9. Pt will increase L knee flexion ROM to 135-140 degrees in order to show improved functional mobility.  10. Increased L SLS balance to 10sec for increased ease with ambulation  11. Pt will ascend/descend 12 steps without rails independently  12. Pt will perform squat lift without pain to L knee    Plan:  Continue  with established Plan of Care towards PT goals.

## 2018-04-12 ENCOUNTER — TELEPHONE (OUTPATIENT)
Dept: REHABILITATION | Facility: HOSPITAL | Age: 53
End: 2018-04-12

## 2018-04-23 ENCOUNTER — CLINICAL SUPPORT (OUTPATIENT)
Dept: REHABILITATION | Facility: HOSPITAL | Age: 53
End: 2018-04-23
Payer: COMMERCIAL

## 2018-04-23 DIAGNOSIS — M23.204 DEGENERATIVE TEAR OF MEDIAL MENISCUS OF LEFT KNEE: ICD-10-CM

## 2018-04-23 DIAGNOSIS — M25.562 ACUTE PAIN OF LEFT KNEE: ICD-10-CM

## 2018-04-23 DIAGNOSIS — M62.81 MUSCLE WEAKNESS: ICD-10-CM

## 2018-04-23 PROCEDURE — 97110 THERAPEUTIC EXERCISES: CPT | Mod: PN

## 2018-04-23 NOTE — PROGRESS NOTES
Name: Arlin Junior  Clinic Number: [id number]  Date of Treatment: 04/23/2018   Diagnosis:   Encounter Diagnoses   Name Primary?    Muscle weakness     Acute pain of left knee     Degenerative tear of medial meniscus of left knee          Start date: 3/22/2018  POC End date: 5/17/2018    Time in: 12:00  Time Out: 1:00  Total Treatment Time: 55  1:1 Time: 45  Visit number: 7    Precautions: L partial med menisectomy on 3/6/18    Subjective:    She reports 0/10 to L knee currently on a 0-10 scale with 0 being no pain and 10 being the worst pain imaginable. She states her L knee pain is 3-4/10 with prolonged working.  She is still taking breaks to ice L knee at work as needed.    Objective  Pocket of edema superior to patella, no redness or signs of infection  L knee ext: 0 deg prior to tx    Range of Motion:   Knee Left active Right Active   Flexion 130 (132 post tx) 139   Extension 1 hyperext (2 hyperext post tx) 5 hyperext       Lower Extremity Strength  Right LE  Left LE    Knee extension: 5/5 Knee extension: 5/5   Knee flexion: 5/5 Knee flexion: 5/5   Hip flexion: 5/5 Hip flexion: 4+/5   Hip extension:  5/5 Hip extension: 5/5   Hip abduction: 4+/5 Hip abduction: 4+/5   Hip adduction: 5/5 Hip adduction 4+/5   Ankle dorsiflexion: 5/5 Ankle dorsiflexion: 5/5       Function:  - SLS R: 16 sec  - SLS L: 14 sec      Arlin  Instructed in and perfomed therapeutic exercises to develop strength, endurance, ROM and flexibility for 55 minutes including:   NP Stationary bike x5 min level 2, seat 5  Seated quad set with 1/2 roll under ankle and strap for gastroc stretch 20 x 5 sec  SLR 2x10 1#  SL hip abd/ext 2x10 each NP  SL clams 2x10 each Y sport loop  Prone hip ext 2x10  NP HS stretch in sitting 3x20 sec (L)   Standing heel raises (SL) 2x10  Pt ascended/descended 4 steps with 1 rail x4 trials without pain  Fwd step up with R hip/knee flexion 6in 2 x 10   Gastroc stretch on incline 3x20 sec  NP Hip abd walk on striped walkway Y  sport loop 1 lap  Lateral step downs 4in step 2x8  Shuttle 4 bands 2x10 (Y sport loop for hip abd iso)  2:1 shuttle 3 bands 2x10  Mini squats 2x10 with manual cue of 18in box + foam   Donkey kicks x10 ea     Pt rec'd patella mobs (superior/inferior, medial) x 5 min     Pt. Received cold pack x 10 min to L knee. Following treatment for pain and inflammation     HEP provided: pt instructed to continue previous HeP.  Instructed pt. Regarding: Proper technique with all exercises. Pt demo good understanding of the education provided. Arlin demonstrated good return demonstration of activities.    Assessment:     Arlin tolerated tx session well reporting 0/10 pain to L knee post tx.  Pt with report of pain lateral and inferior to patella with lateral step downs, but improved with decreasing height to 4in.  She is improving with increased L knee ROM and increased L LE strength despite having missed PT visits due to L knee infection.  Infection cleared with antibiotics.  Pt will continue to benefit from skilled PT intervention to address the remaining functional deficits, maximize pt's level of independence in the home and community environment, as well as Continued inability to participate in vocational pursuits, Pain limits function of effected part for some activities, Unable to participate fully in daily activities and Weakness.    Patient is making good progress towards established goals.    Short Term Goals:  4 weeks  1. Pt will report decreased L knee pain to 3/10 in order to increase tolerance for standing/walking.  2. Pt will increase L knee extension ROM to 2 deg hyperextension in order to show improved functional mobility. (progressing)  3. Pt will increase L knee flexion ROM by at least 5 degrees in order to show improved functional mobility. (progressing)  4. Increased L SLS balance to 5sec for increased ease with ambulation (met)  5. Pt will be independent and consistent with issued HEP in order to show carryover  between therapy sessions. (met)  6. Pt will ambulate 300 ft without AD independently without significant gait deviation (met)     Long Term Goals: 8 weeks       7. Pt will report decreased L knee pain to 1/10 in order to increase tolerance for standing/walking.  8. Pt will report returning to working full shifts at [employer] without significant pain.  9. Pt will increase L knee flexion ROM to 135-140 degrees in order to show improved functional mobility.  10. Increased L SLS balance to 10sec for increased ease with ambulation  11. Pt will ascend/descend 12 steps without rails independently  12. Pt will perform squat lift without pain to L knee    Unmet goals continue to remain appropriate within 8 weeks.    Plan:  Continue with established Plan of Care towards PT goals.

## 2018-04-26 ENCOUNTER — CLINICAL SUPPORT (OUTPATIENT)
Dept: REHABILITATION | Facility: HOSPITAL | Age: 53
End: 2018-04-26
Payer: COMMERCIAL

## 2018-04-26 DIAGNOSIS — M23.204 DEGENERATIVE TEAR OF MEDIAL MENISCUS OF LEFT KNEE: ICD-10-CM

## 2018-04-26 DIAGNOSIS — M25.562 ACUTE PAIN OF LEFT KNEE: ICD-10-CM

## 2018-04-26 DIAGNOSIS — M62.81 MUSCLE WEAKNESS: ICD-10-CM

## 2018-04-26 PROCEDURE — 97110 THERAPEUTIC EXERCISES: CPT | Mod: PN

## 2018-04-26 NOTE — PROGRESS NOTES
Name: Arlin Junior  Clinic Number: [id number]  Date of Treatment: 04/26/2018   Diagnosis:   Encounter Diagnoses   Name Primary?    Muscle weakness     Acute pain of left knee     Degenerative tear of medial meniscus of left knee          Start date: 3/22/2018  POC End date: 5/17/2018    Time in: 2:00  Time Out: 3:00  Total Treatment Time: 55  1:1 Time: 45  Visit number: 9    Precautions: L partial med menisectomy on 3/6/18    Subjective:    She reports 0/10 to L knee currently on a 0-10 scale with 0 being no pain and 10 being the worst pain imaginable. She states her L knee pain is 4/10 with prolonged working.  She states she was on her feet for 5 hrs straight yesterday at work with increased L knee pain which lingered.    Objective  Pocket of edema superior to patella, no redness or signs of infection  Prior to tx: L knee ext: 1 hyperext deg prior to tx, 130 deg flex      Arlin  Instructed in and perfomed therapeutic exercises to develop strength, endurance, ROM and flexibility for 55 minutes including:   NP Stationary bike x5 min level 2, seat 5  Seated quad set with 1/2 roll under ankle and strap for gastroc stretch 20 x 5 sec  SLR 2x10 1#  SL hip abd/ext 2x10 each NP  SL clams 2x10 each G sport loop  NP Prone hip ext 2x10  NP HS stretch in sitting 3x20 sec (L)   NP Standing heel raises (SL) 2x10  Fwd step up with R hip/knee flexion 6in 2 x 10   Gastroc stretch on incline 3x20 sec  Hip abd walk on striped walkway G sport loop 1 lap  Lateral step downs 4in step 2x10  Shuttle 4 bands 3x10 (G sport loop for hip abd iso)  2:1 shuttle 3 bands 3x10  NP Mini squats 2x10 with manual cue of 18in box + foam   ADDED:  Bridges with G sport loop for hip abd iso 2x10  pilates stepper (2 bands L4) 2x10  Pt rec'd patella mobs (superior/inferior, medial) x 5 min     Pt. Received cold pack x 10 min to L knee. Following treatment for pain and inflammation     HEP provided: pt instructed to continue previous HeP.  Instructed pt.  Regarding: Proper technique with all exercises. Pt demo good understanding of the education provided. Arlin demonstrated good return demonstration of activities.    Assessment:     Arlin tolerated tx session well reporting 0/10 pain to L knee post tx.  Pt continues to require occasional cues to avoid L knee valgus with ex.  Improved loading of L LE with 2:1 on shuttle, still slight pain noted with knee extension phase of lateral step downs.  Pt will continue to benefit from skilled PT intervention to address the remaining functional deficits, maximize pt's level of independence in the home and community environment, as well as Continued inability to participate in vocational pursuits, Pain limits function of effected part for some activities, Unable to participate fully in daily activities and Weakness.    Patient is making good progress towards established goals.    Short Term Goals:  4 weeks  1. Pt will report decreased L knee pain to 3/10 in order to increase tolerance for standing/walking.  2. Pt will increase L knee extension ROM to 2 deg hyperextension in order to show improved functional mobility. (progressing)  3. Pt will increase L knee flexion ROM by at least 5 degrees in order to show improved functional mobility. (progressing)  4. Increased L SLS balance to 5sec for increased ease with ambulation (met)  5. Pt will be independent and consistent with issued HEP in order to show carryover between therapy sessions. (met)  6. Pt will ambulate 300 ft without AD independently without significant gait deviation (met)     Long Term Goals: 8 weeks       7. Pt will report decreased L knee pain to 1/10 in order to increase tolerance for standing/walking.  8. Pt will report returning to working full shifts at [employer] without significant pain.  9. Pt will increase L knee flexion ROM to 135-140 degrees in order to show improved functional mobility.  10. Increased L SLS balance to 10sec for increased ease with  ambulation  11. Pt will ascend/descend 12 steps without rails independently  12. Pt will perform squat lift without pain to L knee      Plan:  Continue with established Plan of Care towards PT goals.

## 2018-04-30 ENCOUNTER — CLINICAL SUPPORT (OUTPATIENT)
Dept: REHABILITATION | Facility: HOSPITAL | Age: 53
End: 2018-04-30
Payer: COMMERCIAL

## 2018-04-30 DIAGNOSIS — M25.562 ACUTE PAIN OF LEFT KNEE: ICD-10-CM

## 2018-04-30 DIAGNOSIS — M23.204 DEGENERATIVE TEAR OF MEDIAL MENISCUS OF LEFT KNEE: ICD-10-CM

## 2018-04-30 DIAGNOSIS — M62.81 MUSCLE WEAKNESS: ICD-10-CM

## 2018-04-30 PROCEDURE — 97110 THERAPEUTIC EXERCISES: CPT | Mod: PN

## 2018-04-30 NOTE — PROGRESS NOTES
Name: Arlin Junior  Clinic Number: [id number]  Date of Treatment: 04/30/2018   Diagnosis:   Encounter Diagnoses   Name Primary?    Muscle weakness     Acute pain of left knee     Degenerative tear of medial meniscus of left knee          Start date: 3/22/2018  POC End date: 5/17/2018    Start Time with PTA: 2:30  Time Out: 3:12  Total Treatment Time: 30  1:1 Time: 30  Visit number: 10    Precautions: L partial med menisectomy on 3/6/18    Subjective:    See PT note.     Objective  Pocket of edema superior to patella, no redness or signs of infection    Arlin  Instructed in and perfomed therapeutic exercises to develop strength, endurance, ROM and flexibility for 25 minutes including:   Stationary bike x 5 min, level 4, seat 5  Fwd step up with R hip/knee flexion 6in 2 x 10 (attempted but held due to onset of L medial knee pain)  Hip abd walk on striped walkway Y sport loop 1 lap (attempted but HELD due to L medial knee pain)  NP - Lateral step downs 4in step 2x10  Shuttle 4 bands 3x10 (G sport loop for hip abd iso)  2:1 shuttle 3 bands 3x10  Mini squats x10 with UE assist  pilates stepper (2 bands L4 - no dowel) 2x10 (2 in step to reach pedal)     Pt. Received cold pack x 10 min to L knee. Following treatment for pain and inflammation     HEP provided: pt instructed to continue previous HeP.  Instructed pt. Regarding: Proper technique with all exercises. Pt demo good understanding of the education provided. Arlin demonstrated good return demonstration of activities.    Assessment:     Arlin tolerated tx session well reporting 0/10 pain to L knee post tx. Continued onset of L medial knee pain with concentric knee extension in WB with forward step ups and lateral step downs were therefore held. Able to perform shuttle press, upright bike, and min squats without onset of L knee pain.   Pt will continue to benefit from skilled PT intervention to address the remaining functional deficits, maximize pt's level of independence  in the home and community environment, as well as Continued inability to participate in vocational pursuits, Pain limits function of effected part for some activities, Unable to participate fully in daily activities and Weakness.    Patient is making good progress towards established goals.    Short Term Goals:  4 weeks  1. Pt will report decreased L knee pain to 3/10 in order to increase tolerance for standing/walking.  2. Pt will increase L knee extension ROM to 2 deg hyperextension in order to show improved functional mobility. (progressing)  3. Pt will increase L knee flexion ROM by at least 5 degrees in order to show improved functional mobility. (progressing)  4. Increased L SLS balance to 5sec for increased ease with ambulation (met)  5. Pt will be independent and consistent with issued HEP in order to show carryover between therapy sessions. (met)  6. Pt will ambulate 300 ft without AD independently without significant gait deviation (met)     Long Term Goals: 8 weeks       7. Pt will report decreased L knee pain to 1/10 in order to increase tolerance for standing/walking.  8. Pt will report returning to working full shifts at [employer] without significant pain.  9. Pt will increase L knee flexion ROM to 135-140 degrees in order to show improved functional mobility.  10. Increased L SLS balance to 10sec for increased ease with ambulation  11. Pt will ascend/descend 12 steps without rails independently  12. Pt will perform squat lift without pain to L knee      Plan:  Continue with established Plan of Care towards PT goals.

## 2018-04-30 NOTE — PROGRESS NOTES
Name: Arlin Junior  Clinic Number: [id number]  Date of Treatment: 04/30/2018   Diagnosis:   No diagnosis found.      Start date: 3/22/2018  POC End date: 5/17/2018    Time in: 2:00  Time Out: 3:00  Total Treatment Time: 55  1:1 Time: 45  Visit number: 9    Precautions: L partial med menisectomy on 3/6/18    Subjective:    She reports 4-5/10 to L knee currently on a 0-10 scale with 0 being no pain and 10 being the worst pain imaginable.   She states even when she ices L knee 3x/day at work she only gets pain relief for 30-40min after icing L knee before it's back to 5/10 pain again    Objective  Pocket of edema superior to patella, no redness or signs of infection  Prior to tx: L knee ext: 1 hyperext deg prior to tx, 124 deg flex      Arlin  Instructed in and perfomed therapeutic exercises to develop strength, endurance, ROM and flexibility for 55 minutes including:   NP Stationary bike x5 min level 2, seat 5  Seated quad set with 1/2 roll under ankle and strap for gastroc stretch 20 x 5 sec  SLR 2x10 2#  SL hip abd/ext 2x10 each NP  SL clams 2x10 each G sport loop  Gastroc stretch on incline 3x20 sec      Pt seen by PTA for remainder of visit     Pt. Received cold pack x 10 min to L knee. Following treatment for pain and inflammation     HEP provided: pt instructed to continue previous HeP.  Instructed pt. Regarding: Proper technique with all exercises. Pt demo good understanding of the education provided. Arlin demonstrated good return demonstration of activities.    Assessment:     Pt without c/o increased L knee pain with mat exercises.  She presented with increased overall L knee edema.  She does not have redness or fever to L knee.  Please see PTA note for remainder of session.  Pt will continue to benefit from skilled PT intervention to address the remaining functional deficits, maximize pt's level of independence in the home and community environment, as well as Continued inability to participate in vocational  pursuits, Pain limits function of effected part for some activities, Unable to participate fully in daily activities and Weakness.    Patient is making good progress towards established goals.    Short Term Goals:  4 weeks  1. Pt will report decreased L knee pain to 3/10 in order to increase tolerance for standing/walking.  2. Pt will increase L knee extension ROM to 2 deg hyperextension in order to show improved functional mobility. (progressing)  3. Pt will increase L knee flexion ROM by at least 5 degrees in order to show improved functional mobility. (progressing)  4. Increased L SLS balance to 5sec for increased ease with ambulation (met)  5. Pt will be independent and consistent with issued HEP in order to show carryover between therapy sessions. (met)  6. Pt will ambulate 300 ft without AD independently without significant gait deviation (met)     Long Term Goals: 8 weeks       7. Pt will report decreased L knee pain to 1/10 in order to increase tolerance for standing/walking.  8. Pt will report returning to working full shifts at [employer] without significant pain.  9. Pt will increase L knee flexion ROM to 135-140 degrees in order to show improved functional mobility.  10. Increased L SLS balance to 10sec for increased ease with ambulation  11. Pt will ascend/descend 12 steps without rails independently  12. Pt will perform squat lift without pain to L knee      Plan:  Continue with established Plan of Care towards PT goals.

## 2018-05-03 ENCOUNTER — CLINICAL SUPPORT (OUTPATIENT)
Dept: REHABILITATION | Facility: HOSPITAL | Age: 53
End: 2018-05-03
Payer: COMMERCIAL

## 2018-05-03 DIAGNOSIS — M23.204 DEGENERATIVE TEAR OF MEDIAL MENISCUS OF LEFT KNEE: ICD-10-CM

## 2018-05-03 DIAGNOSIS — M62.81 MUSCLE WEAKNESS: ICD-10-CM

## 2018-05-03 DIAGNOSIS — M25.562 ACUTE PAIN OF LEFT KNEE: ICD-10-CM

## 2018-05-03 PROCEDURE — 97110 THERAPEUTIC EXERCISES: CPT | Mod: PN

## 2018-05-03 PROCEDURE — 97014 ELECTRIC STIMULATION THERAPY: CPT | Mod: PN

## 2018-05-03 NOTE — PROGRESS NOTES
Name: Arlin Junior  Clinic Number: [id number]  Date of Treatment: 05/03/2018   Diagnosis:   Encounter Diagnoses   Name Primary?    Muscle weakness     Acute pain of left knee     Degenerative tear of medial meniscus of left knee          Start date: 3/22/2018  POC End date: 5/17/2018    Time in: 3:00  Time Out: 4:00  Total Treatment Time: 45  1:1 Time: 45  Visit number: 11    Precautions: L partial med menisectomy on 3/6/18    Subjective: pt reports 4-5/10 pain to L knee presently.  She states she starts having increased pain and swelling to L knee after 1-2 hrs of working.  She states she only got 10min of pain relief to L knee after ice post tx last visit.  Pt states she's been taking 1 Meloxicam and 2 Aleve to minimize pain and inflammation.  She states she has difficulty sleeping at night due to L knee pain      Objective  Pocket of edema superior to patella, no redness or signs of infection  Knee jt line girth:  L: 52 cm  R:48.2 cm    Decreased L LE stance during gait due to L knee pain.    Arlin  Instructed in and perfomed therapeutic exercises to develop strength, endurance, ROM and flexibility for 35 minutes including:   Stationary bike x 5 min, level 4, seat 5  NP Fwd step up with R hip/knee flexion 6in 2 x 10   NP Hip abd walk on striped walkway Y sport loop 1 lap   NP - Lateral step downs 4in step 2x10  Shuttle 4 bands 3x10 (G sport loop for hip abd iso)  2:1 shuttle 3 bands 3x10  Mini squats x10 with UE assist  pilates stepper (2 bands L4 - no dowel) 2x10 (2 in step to reach pedal)     Pt educated on risks/benefits of IFC.  Pt gave verbal understanidng/informed consent.  Pt rec'd IFC to L knee in conjunction with ice pack x10 min for decreased pain and edema.       HEP provided: pt instructed to continue previous HeP.  Instructed pt. Regarding: Proper technique with all exercises. Pt demo good understanding of the education provided. Arlin demonstrated good return demonstration of  activities.    Assessment:     Arlin tolerated tx session well reporting 0/10 pain to L knee post tx. She continues to present with increased L knee edema and increased report of pain.  Did not perform step ex today due to increased edema and pain upon entering clinic. She is scheduled to follow up with Dr. Nevarez on Monday.  Pt will continue to benefit from skilled PT intervention to address the remaining functional deficits, maximize pt's level of independence in the home and community environment, as well as Continued inability to participate in vocational pursuits, Pain limits function of effected part for some activities, Unable to participate fully in daily activities and Weakness.    Patient is making good progress towards established goals.    Short Term Goals:  4 weeks  1. Pt will report decreased L knee pain to 3/10 in order to increase tolerance for standing/walking.  2. Pt will increase L knee extension ROM to 2 deg hyperextension in order to show improved functional mobility. (progressing)  3. Pt will increase L knee flexion ROM by at least 5 degrees in order to show improved functional mobility. (progressing)  4. Increased L SLS balance to 5sec for increased ease with ambulation (met)  5. Pt will be independent and consistent with issued HEP in order to show carryover between therapy sessions. (met)  6. Pt will ambulate 300 ft without AD independently without significant gait deviation (met)     Long Term Goals: 8 weeks       7. Pt will report decreased L knee pain to 1/10 in order to increase tolerance for standing/walking.  8. Pt will report returning to working full shifts at [employer] without significant pain.  9. Pt will increase L knee flexion ROM to 135-140 degrees in order to show improved functional mobility.  10. Increased L SLS balance to 10sec for increased ease with ambulation  11. Pt will ascend/descend 12 steps without rails independently  12. Pt will perform squat lift without  pain to L knee      Plan:  Continue with established Plan of Care towards PT goals.

## 2018-05-08 ENCOUNTER — CLINICAL SUPPORT (OUTPATIENT)
Dept: REHABILITATION | Facility: HOSPITAL | Age: 53
End: 2018-05-08
Payer: COMMERCIAL

## 2018-05-08 DIAGNOSIS — M62.81 MUSCLE WEAKNESS: ICD-10-CM

## 2018-05-08 DIAGNOSIS — M25.562 ACUTE PAIN OF LEFT KNEE: ICD-10-CM

## 2018-05-08 DIAGNOSIS — M23.204 DEGENERATIVE TEAR OF MEDIAL MENISCUS OF LEFT KNEE: ICD-10-CM

## 2018-05-08 PROCEDURE — 97110 THERAPEUTIC EXERCISES: CPT | Mod: PN

## 2018-05-08 NOTE — PROGRESS NOTES
"Name: Arlin Junior  Clinic Number: [id number]  Date of Treatment: 05/08/2018   Diagnosis:   Encounter Diagnoses   Name Primary?    Muscle weakness     Acute pain of left knee     Degenerative tear of medial meniscus of left knee          Start date: 3/22/2018  POC End date: 5/17/2018    Time in: 8:00  Time Out: 9:07  Total Treatment Time: 55  1:1 Time: 30  Visit number: 12    Precautions: L partial med menisectomy on 3/6/18    Subjective: pt reports 1-2/10 pain to L knee presently. States she saw her doctor who gave her a steroid injection which has helped reduce pain and edema.       Objective  Pocket of edema superior to patella, no redness or signs of infection  Knee jt line girth:  L: 52 cm  R:48.2 cm    Decreased L LE stance during gait due to L knee pain.    Arlin  Instructed in and perfomed therapeutic exercises to develop strength, endurance, ROM and flexibility for 55 minutes including:     Stationary bike x 10 min, level 3.5, seat 6  Fwd step up with R hip/knee flexion 6in 2 x 10   Hip abd walk on striped walkway Y sport loop 1 lap   Lateral step downs 4in step 2x10  Shuttle 4 bands 3x10 (G sport loop for hip abd iso)  2:1 shuttle 3 bands 3x10   Standing heel raises 3x10  Mini squats x10 with UE assist  pilates stepper (3 bands L3) 2x10 each (2 in step to reach pedal)     Pt educated on risks/benefits of IFC.  Pt gave verbal understanidng/informed consent.  Pt rec'd IFC to L knee in conjunction with ice pack x10 min for decreased pain and edema.       HEP provided: pt instructed to continue previous HeP.  Instructed pt. Regarding: Proper technique with all exercises. Pt demo good understanding of the education provided. Arlin demonstrated good return demonstration of activities.    Assessment:     Pt with improved tolerance to treatment this date. Able to resume all exercises this date yet minimal progression to prevent over-straining of L LE. She reported 0/10 pain to L knee post tx. Occasional "twinge" " reported in L knee during second set of lateral step downs and forward step ups. Pt continues with pocket of swelling/fluid over patella, yet decreased L knee edema overall.   Pt will continue to benefit from skilled PT intervention to address the remaining functional deficits, maximize pt's level of independence in the home and community environment, as well as Continued inability to participate in vocational pursuits, Pain limits function of effected part for some activities, Unable to participate fully in daily activities and Weakness.    Patient is making good progress towards established goals.    Short Term Goals:  4 weeks  1. Pt will report decreased L knee pain to 3/10 in order to increase tolerance for standing/walking.  2. Pt will increase L knee extension ROM to 2 deg hyperextension in order to show improved functional mobility. (progressing)  3. Pt will increase L knee flexion ROM by at least 5 degrees in order to show improved functional mobility. (progressing)  4. Increased L SLS balance to 5sec for increased ease with ambulation (met)  5. Pt will be independent and consistent with issued HEP in order to show carryover between therapy sessions. (met)  6. Pt will ambulate 300 ft without AD independently without significant gait deviation (met)     Long Term Goals: 8 weeks       7. Pt will report decreased L knee pain to 1/10 in order to increase tolerance for standing/walking.  8. Pt will report returning to working full shifts at [employer] without significant pain.  9. Pt will increase L knee flexion ROM to 135-140 degrees in order to show improved functional mobility.  10. Increased L SLS balance to 10sec for increased ease with ambulation  11. Pt will ascend/descend 12 steps without rails independently  12. Pt will perform squat lift without pain to L knee      Plan:  Continue with established Plan of Care towards PT goals.

## 2018-05-16 ENCOUNTER — CLINICAL SUPPORT (OUTPATIENT)
Dept: REHABILITATION | Facility: HOSPITAL | Age: 53
End: 2018-05-16
Payer: COMMERCIAL

## 2018-05-16 DIAGNOSIS — M23.204 DEGENERATIVE TEAR OF MEDIAL MENISCUS OF LEFT KNEE: ICD-10-CM

## 2018-05-16 DIAGNOSIS — M62.81 MUSCLE WEAKNESS: ICD-10-CM

## 2018-05-16 DIAGNOSIS — M25.562 ACUTE PAIN OF LEFT KNEE: ICD-10-CM

## 2018-05-16 PROCEDURE — 97110 THERAPEUTIC EXERCISES: CPT | Mod: PN

## 2018-05-16 NOTE — PROGRESS NOTES
"Name: Arlin Junior  Clinic Number: [id number]  Date of Treatment: 05/16/2018   Diagnosis:   Encounter Diagnoses   Name Primary?    Muscle weakness     Acute pain of left knee     Degenerative tear of medial meniscus of left knee          Start date: 3/22/2018  POC End date: 5/17/2018    Time in: 5:05  Time Out: 6:10  Total Treatment Time: 55  1:1 Time: 55  Visit number: 13    Precautions: L partial med menisectomy on 3/6/18    Subjective: pt reports 1-2/10 pain to L knee presently. States the steroid injection has worn off some, causing occasional sharp "pings" in the L knee while walking. Reports having mild pocket of swelling on the side of the knee, just distal to the L VMO.    Objective    Arlin  Instructed in and perfomed therapeutic exercises to develop strength, endurance, ROM and flexibility for 55 minutes including:     Stationary bike x 10 min, level 4, seat 6  Prone quad stretch 3 x 20 sec with strap  Fwd step up with R hip/knee flexion 6in 2 x 10   Hip abd walk on striped walkway Y sport loop 1 lap   Lateral step downs 4in step 2x10  Shuttle 4 bands 3x10 (G sport loop for hip abd iso)  2:1 shuttle 3 bands 3x10  Standing heel raises 3x10  Mini squats x10 with UE assist  pilates stepper (3 bands L3) 2x10 each (2 in step to reach pedal)     Not Performed- Pt educated on risks/benefits of IFC.  Pt gave verbal understanidng/informed consent.  Pt rec'd IFC to L knee in conjunction with ice pack x10 min for decreased pain and edema.    Patient received cold pack to L knee x 10 minutes post treatment for pain and edema.     HEP provided: pt instructed to continue previous HeP.  Instructed pt. Regarding: Proper technique with all exercises. Pt demo good understanding of the education provided. Arlin demonstrated good return demonstration of activities.    Assessment:     Arlin tolerated treatment well this date overall and reported 0/10 pain to L knee post tx. Added prone quad stretch for reduced anterior thigh " tightness and improved patellar mobility with quad activation. She continues with occasional reports of pain with L knee motions in WB including lateral step downs and shuttle press. Overall improvement of strength with decreased edema present. She will benefit from continued strengthening.   Pt will continue to benefit from skilled PT intervention to address the remaining functional deficits, maximize pt's level of independence in the home and community environment, as well as Continued inability to participate in vocational pursuits, Pain limits function of effected part for some activities, Unable to participate fully in daily activities and Weakness.    Patient is making good progress towards established goals.    Short Term Goals:  4 weeks  1. Pt will report decreased L knee pain to 3/10 in order to increase tolerance for standing/walking.  2. Pt will increase L knee extension ROM to 2 deg hyperextension in order to show improved functional mobility. (progressing)  3. Pt will increase L knee flexion ROM by at least 5 degrees in order to show improved functional mobility. (progressing)  4. Increased L SLS balance to 5sec for increased ease with ambulation (met)  5. Pt will be independent and consistent with issued HEP in order to show carryover between therapy sessions. (met)  6. Pt will ambulate 300 ft without AD independently without significant gait deviation (met)     Long Term Goals: 8 weeks       7. Pt will report decreased L knee pain to 1/10 in order to increase tolerance for standing/walking.  8. Pt will report returning to working full shifts at [employer] without significant pain.  9. Pt will increase L knee flexion ROM to 135-140 degrees in order to show improved functional mobility.  10. Increased L SLS balance to 10sec for increased ease with ambulation  11. Pt will ascend/descend 12 steps without rails independently  12. Pt will perform squat lift without pain to L  knee      Plan:  Continue with established Plan of Care towards PT goals.

## 2018-05-18 ENCOUNTER — CLINICAL SUPPORT (OUTPATIENT)
Dept: REHABILITATION | Facility: HOSPITAL | Age: 53
End: 2018-05-18
Payer: COMMERCIAL

## 2018-05-18 DIAGNOSIS — M23.204 DEGENERATIVE TEAR OF MEDIAL MENISCUS OF LEFT KNEE: ICD-10-CM

## 2018-05-18 DIAGNOSIS — M62.81 MUSCLE WEAKNESS: ICD-10-CM

## 2018-05-18 DIAGNOSIS — M25.562 ACUTE PAIN OF LEFT KNEE: ICD-10-CM

## 2018-05-18 PROCEDURE — 97110 THERAPEUTIC EXERCISES: CPT | Mod: PN

## 2018-05-18 NOTE — PROGRESS NOTES
"Name: Arlin Junior  Clinic Number: [id number]  Date of Treatment: 05/18/2018   Diagnosis:   Encounter Diagnoses   Name Primary?    Muscle weakness     Acute pain of left knee     Degenerative tear of medial meniscus of left knee          Start date: 3/22/2018  POC End date: 5/17/2018    Time in: 8:04  Time Out: 9:10  Total Treatment Time: 55  1:1 Time: 55  Visit number: 15    Precautions: L partial med menisectomy on 3/6/18    Subjective    Arlin reports having 2/10 pain to L knee presently. States pain wakes her up at night 1-2 times usually. Reports having increased tightness and swelling on lateral L knee.     Objective    Arlin  Instructed in and perfomed therapeutic exercises to develop strength, endurance, ROM and flexibility for 55 minutes including:     Stationary bike x 10 min, level 4, seat 6  Prone quad stretch 3 x 20 sec with strap  Quad set in long sitting with manual overpressure for medial glide 10 x 5"  Fwd step up with R hip/knee flexion 6in 2 x 10   Hip abd walk on striped walkway Y sport loop 2 laps   Lateral step downs 6in step 2x10  Shuttle 4 bands 3x10 (G sport loop for hip abd iso)  2:1 shuttle 3 bands 3x10  Standing heel raises 3x10  Mini squats 3x10 with 8in step to prevent knees over toes, min UE assist  pilates stepper (3 bands L3) 2x10 each (2 in step to reach pedal)     Not Performed- Pt educated on risks/benefits of IFC.  Pt gave verbal understanidng/informed consent.  Pt rec'd IFC to L knee in conjunction with ice pack x10 min for decreased pain and edema.    Patient received cold pack to L knee x 10 minutes post treatment for pain and edema.     HEP provided: pt instructed to continue previous HeP including additional prone quad stretch and using roller on L quad to improve tissue mobility.  Instructed pt. Regarding: Proper technique with all exercises. Pt demo good understanding of the education provided. Arlin demonstrated good return demonstration of activities.    Assessment: "     Arlin demonstrated overall good tolerance to treatment this date. She reported 0/10 pain to L knee post treatment. Mild lateral tracking of L patella noted this date due to distal quad tightness and decreased activation of the L VMO. Verbal and manual cues occasionally with lateral step downs to prevent slight valgus which decreases medial patellar pain.  Pt will continue to benefit from skilled PT intervention to address the remaining functional deficits, maximize pt's level of independence in the home and community environment, as well as Continued inability to participate in vocational pursuits, Pain limits function of effected part for some activities, Unable to participate fully in daily activities and Weakness.    Patient is making good progress towards established goals.    Short Term Goals:  4 weeks  1. Pt will report decreased L knee pain to 3/10 in order to increase tolerance for standing/walking.  2. Pt will increase L knee extension ROM to 2 deg hyperextension in order to show improved functional mobility. (progressing)  3. Pt will increase L knee flexion ROM by at least 5 degrees in order to show improved functional mobility. (progressing)  4. Increased L SLS balance to 5sec for increased ease with ambulation (met)  5. Pt will be independent and consistent with issued HEP in order to show carryover between therapy sessions. (met)  6. Pt will ambulate 300 ft without AD independently without significant gait deviation (met)     Long Term Goals: 8 weeks       7. Pt will report decreased L knee pain to 1/10 in order to increase tolerance for standing/walking.  8. Pt will report returning to working full shifts at [employer] without significant pain.  9. Pt will increase L knee flexion ROM to 135-140 degrees in order to show improved functional mobility.  10. Increased L SLS balance to 10sec for increased ease with ambulation  11. Pt will ascend/descend 12 steps without rails  independently  12. Pt will perform squat lift without pain to L knee      Plan:  Continue with established Plan of Care towards PT goals.

## 2018-05-24 ENCOUNTER — CLINICAL SUPPORT (OUTPATIENT)
Dept: REHABILITATION | Facility: HOSPITAL | Age: 53
End: 2018-05-24
Payer: COMMERCIAL

## 2018-05-24 DIAGNOSIS — M62.81 MUSCLE WEAKNESS: ICD-10-CM

## 2018-05-24 DIAGNOSIS — M23.204 DEGENERATIVE TEAR OF MEDIAL MENISCUS OF LEFT KNEE: ICD-10-CM

## 2018-05-24 DIAGNOSIS — M25.562 ACUTE PAIN OF LEFT KNEE: ICD-10-CM

## 2018-05-24 PROCEDURE — 97110 THERAPEUTIC EXERCISES: CPT | Mod: PN

## 2018-05-24 PROCEDURE — 97140 MANUAL THERAPY 1/> REGIONS: CPT | Mod: PN

## 2018-05-24 NOTE — PROGRESS NOTES
[facility] Physical Therapy Progress Note    Name: Arlin Junior  Clinic Number: [id number]  Date of Treatment: 05/24/2018   Diagnosis:   Encounter Diagnoses   Name Primary?    Muscle weakness     Acute pain of left knee     Degenerative tear of medial meniscus of left knee      Start date: 3/22/2018  POC End date: 06/21/2018    Time in: 9:04  Time Out: 10:15  Total Treatment Time: 55  1:1 Time: 55  Visit number: 16/20    Precautions: L partial med menisectomy on 3/6/18    Subjective  Pt reports not much pain currently upon entering clinic, 1/10. She reports 3-4/10 pain when getting up in middle of night to use the restroom. At end of work shift (10 hour shift mainly standing at [employer]) and when getting up in the morning with reports of 4-5/10 pain. She reports distal patellar pain and medial knee pain when she has pain. She reports approx 70% improvement since initial evaluation and decreased tightness to inferior patella since start of treatment. Pain level measured on a 0-10 scale with 0 being no pain and 10 being the worst pain imaginable.    Objective    Knee Left active Right Active   Flexion 130 (132 post tx) 139   Extension 4 hyperext (5 hyperext post tx) 5 hyperext       Lower Extremity Strength  Right LE  Left LE    Knee extension: 5/5 Knee extension: 5/5   Knee flexion: 5/5 Knee flexion: 5/5   Hip flexion: 5/5 Hip flexion: 4+/5   Hip extension:  5/5 Hip extension: 5/5   Hip abduction: 4+/5 Hip abduction: 4+/5   Hip adduction: 5/5 Hip adduction 5/5   Ankle dorsiflexion: 5/5 Ankle dorsiflexion: 5/5     Function:  - SLS R: 19 sec  - SLS L: 17 seconds, 22 seconds    Arlin  Instructed in and perfomed therapeutic exercises to develop strength, endurance, ROM and flexibility for 25 minutes including:     Quad set in supine x 30 with 3 second hold  Lateral step downs 6in step 2x10 - req  Fwd step up with R hip/knee flexion 6in 2 x 10   Hip abd walk on striped walkway Y  sport loop 2 laps   2:1 shuttle 3 bands 3x10  Self patellar mobs into superior glide x 15  Mini squats 1x10     Pt received manual therapy techniques to L knee with IASTM along inferior border or patella medially and laterally to decrease scar tissue, improve mobilization, improve circulation, and promote healing. STM to L posterior knee along plantaris and lateral aspect of popliteal fossa. The above performed for a total of 25 minutes    Patient received cold pack to L knee x 10 minutes post treatment for pain and edema.     Not performed  Stationary bike x 10 min, level 4, seat 6  Shuttle 4 bands 3x10 (G sport loop for hip abd iso)  Standing heel raises 3x10  pilates stepper (3 bands L3) 2x10 each (2 in step to reach pedal)    Not Performed- Pt educated on risks/benefits of IFC.  Pt gave verbal understanidng/informed consent.  Pt rec'd IFC to L knee in conjunction with ice pack x10 min for decreased pain and edema.    HEP provided: instruction on self superior patellar mobilization  Pt demo good understanding of the education provided. Arlin demonstrated good return demonstration of activities.    Assessment:   Pt required assist with patellar mobilization superior and medial during lateral step downs in stair and cuing for weight shift posteriorly to decrease stress to anterior knee. She presents with improved patellar mobilization into superior glides as well as decreased edema and tissue tightness to inferior patella.  She continues with ROM limitations compared to opposite extremity as well as decreased patellar mobilization due to scar tissue to inferior aspect of patella. Pt required verbal cues during mini squat for proper technique with weight shift into heals to prevent increased stress to knee joint. Pt is making progress with ROM and strength since initial evaluation. She continues with limitations of performing stairs and lifting objects for work.     Pt will continue to benefit from skilled PT  intervention to address the remaining functional deficits, maximize pt's level of independence in the home and community environment, as well as Continued inability to participate in vocational pursuits, Pain limits function of effected part for some activities, Unable to participate fully in daily activities and Weakness.    Patient is making good progress towards established goals.    Short Term Goals:  4 weeks  1. Pt will report decreased L knee pain to 3/10 in order to increase tolerance for standing/walking. - MET, but increases at end of day  2. Pt will increase L knee extension ROM to 2 deg hyperextension in order to show improved functional mobility. (met)  3. Pt will increase L knee flexion ROM by at least 5 degrees in order to show improved functional mobility. (progressing)  4. Increased L SLS balance to 5sec for increased ease with ambulation - (met)  5. Pt will be independent and consistent with issued HEP in order to show carryover between therapy sessions. (met)  6. Pt will ambulate 300 ft without AD independently without significant gait deviation (met)     Long Term Goals: 8 weeks       7. Pt will report decreased L knee pain to 1/10 in order to increase tolerance for standing/walking. -  MET but increases at end of day  8. Pt will report returning to working full shifts at [employer] without significant pain. - (in progress)  9. Pt will increase L knee flexion ROM to 135-140 degrees in order to show improved functional mobility.  10. Increased L SLS balance to 10sec for increased ease with ambulation (met)  11. Pt will ascend/descend 12 steps without rails independently (progressing)   12. Pt will perform squat lift without pain to L knee (progressing)    Plan:  Pt to continue 1 time per week for 4 additional weeks (6/21/18) to address remaining ROM limitations and tissue tightness.

## 2018-05-24 NOTE — PATIENT INSTRUCTIONS
Self-Mobilization: Upward Kneecap Pull        With thumbs on lower border of left kneecap, gently pull kneecap toward hip. Hold __5__ seconds.  Repeat _10___ times per set. Do __1__ sets per session. Do __3__ sessions per day.     [email or web address]     Copyright © I. All rights reserved.

## 2018-05-28 ENCOUNTER — CLINICAL SUPPORT (OUTPATIENT)
Dept: REHABILITATION | Facility: HOSPITAL | Age: 53
End: 2018-05-28
Payer: COMMERCIAL

## 2018-05-28 DIAGNOSIS — M62.81 MUSCLE WEAKNESS: ICD-10-CM

## 2018-05-28 DIAGNOSIS — M23.204 DEGENERATIVE TEAR OF MEDIAL MENISCUS OF LEFT KNEE: ICD-10-CM

## 2018-05-28 DIAGNOSIS — M25.562 ACUTE PAIN OF LEFT KNEE: ICD-10-CM

## 2018-05-28 PROCEDURE — 97140 MANUAL THERAPY 1/> REGIONS: CPT | Mod: PN

## 2018-05-28 PROCEDURE — 97110 THERAPEUTIC EXERCISES: CPT | Mod: PN

## 2018-05-28 NOTE — PROGRESS NOTES
[facility] Physical Therapy Daily Note    Name: Arlin Junior  Clinic Number: [id number]  Date of Treatment: 05/28/2018   Diagnosis:   Encounter Diagnoses   Name Primary?    Muscle weakness     Acute pain of left knee     Degenerative tear of medial meniscus of left knee      Start date: 3/22/2018  POC End date: 06/21/2018    Time in: 9:10  Time Out: 9:11  Total Treatment Time: 50  1:1 Time: 50  Visit number: 17/20    Precautions: L partial med menisectomy on 3/6/18    Subjective    Pt reports soreness and bruising at medial knee from previous treatment session, but is not as sore. States she was not able to perform the patellar mobilizations because of the tenderness over the medial knee, but did keep up with exercises. She reports her L knee pain at 1-2/10 on a 0-10 scale with 0 being no pain and 10 being the worst pain imaginable.    Objective    Arlin  Instructed in and perfomed therapeutic exercises to develop strength, endurance, ROM and flexibility for 30 minutes including:     Quad set in supine x 30 with 3 second hold  Lateral step downs 6in step 2x10   Fwd step up with R hip/knee flexion 6in 2 x 10   Standing heel raises 3x10  Hip abd walk on striped walkway Y sport loop 2 laps   Shuttle 4 bands 3x10 (G sport loop for hip abd iso)  2:1 shuttle 3 bands 3x10  Self patellar mobs into superior glide x 15  Mini squats 2x10     Pt received manual therapy techniques to L knee with IASTM along distal vastus lateralis and ITB to decreased myofascial tightness and improve circulation, and promote healing. STM to L medial fat pad and distal medial knee. The above performed for a total of 20 minutes    Patient received cold pack to L knee x 10 minutes post treatment for pain and edema.     Not performed  Stationary bike x 10 min, level 4, seat 6  pilates stepper (3 bands L3) 2x10 each (2 in step to reach pedal)    HEP provided: instruction on self superior patellar mobilization  Pt demo good  understanding of the education provided. Arlin demonstrated good return demonstration of activities.    Assessment:   Pt tolerated treatment well this date overall. Mild tightness of patellar mobility which improved following manual techniques. LE strength and mobility is slowly improving, cues required with standing mini squats and lateral step downs to prevent valgus as well as weight shift into forefoot to reduce stress to medial and anterior L knee. Pt demonstrated good return demonstration of self patellar mobilizations.  Pt will continue to benefit from skilled PT intervention to address the remaining functional deficits, maximize pt's level of independence in the home and community environment, as well as Continued inability to participate in vocational pursuits, Pain limits function of effected part for some activities, Unable to participate fully in daily activities and Weakness.    Patient is making good progress towards established goals.    Short Term Goals:  4 weeks  1. Pt will report decreased L knee pain to 3/10 in order to increase tolerance for standing/walking. - MET, but increases at end of day  2. Pt will increase L knee extension ROM to 2 deg hyperextension in order to show improved functional mobility. (met)  3. Pt will increase L knee flexion ROM by at least 5 degrees in order to show improved functional mobility. (progressing)  4. Increased L SLS balance to 5sec for increased ease with ambulation - (met)  5. Pt will be independent and consistent with issued HEP in order to show carryover between therapy sessions. (met)  6. Pt will ambulate 300 ft without AD independently without significant gait deviation (met)     Long Term Goals: 8 weeks       7. Pt will report decreased L knee pain to 1/10 in order to increase tolerance for standing/walking. -  MET but increases at end of day  8. Pt will report returning to working full shifts at [employer] without significant pain. - (in  progress)  9. Pt will increase L knee flexion ROM to 135-140 degrees in order to show improved functional mobility.  10. Increased L SLS balance to 10sec for increased ease with ambulation (met)  11. Pt will ascend/descend 12 steps without rails independently (progressing)   12. Pt will perform squat lift without pain to L knee (progressing)    Plan:  Pt to continue 1 time per week for 4 additional weeks (6/21/18) to address remaining ROM limitations and tissue tightness.

## 2018-06-06 ENCOUNTER — CLINICAL SUPPORT (OUTPATIENT)
Dept: REHABILITATION | Facility: HOSPITAL | Age: 53
End: 2018-06-06
Payer: COMMERCIAL

## 2018-06-06 DIAGNOSIS — M62.81 MUSCLE WEAKNESS: ICD-10-CM

## 2018-06-06 DIAGNOSIS — M23.204 DEGENERATIVE TEAR OF MEDIAL MENISCUS OF LEFT KNEE: ICD-10-CM

## 2018-06-06 DIAGNOSIS — M25.562 ACUTE PAIN OF LEFT KNEE: ICD-10-CM

## 2018-06-06 PROCEDURE — 97110 THERAPEUTIC EXERCISES: CPT | Mod: PN

## 2018-06-06 NOTE — PROGRESS NOTES
[facility] Physical Therapy Daily Note    Name: Arlin Junior  Clinic Number: [id number]  Date of Treatment: 06/06/2018   Diagnosis:   Encounter Diagnoses   Name Primary?    Muscle weakness     Acute pain of left knee     Degenerative tear of medial meniscus of left knee      Start date: 3/22/2018  POC End date: 06/21/2018    Time in: 9:00  Time Out: 10:00  Total Treatment Time: 50  1:1 Time: 50  Visit number: 18/20    Precautions: L partial med menisectomy on 3/6/18    Subjective    Pt reports 2-3/10 pain to L knee, presently and 6-7/10 at end of her work shift at [employer].  She states she was off of work while she was in [place] and did not have as much pain.  She states when she has pain, it's to posterior L knee.  Objective    Arlin  Instructed in and perfomed therapeutic exercises to develop strength, endurance, ROM and flexibility for 30 minutes including:     Quad set in supine x 30 with 3 second hold  SL clams Y sport loop 2x10   Lateral step downs 6in step 2x10   Fwd step up with R hip/knee flexion 6in 2 x 10   Standing heel raises 3x10  Hip abd walk on striped walkway Y sport loop 2 laps   Shuttle 4 bands 3x10 (G sport loop for hip abd iso)  2:1 shuttle 3 bands 3x10  Self patellar mobs into superior glide x 15  Mini squats 2x10     Pt received manual therapy techniques to L knee with IASTM along distal vastus lateralis and ITB to decreased myofascial tightness and improve circulation, and promote healing. STM to L medial fat pad and distal medial knee. The above performed for a total of 20 minutes    Patient received cold pack to L knee x 10 minutes post treatment for pain and edema.     Not performed  Stationary bike x 10 min, level 4, seat 6  pilates stepper (3 bands L3) 2x10 each (2 in step to reach pedal)    HEP provided: instruction on self superior patellar mobilization  Pt demo good understanding of the education provided. Arlin demonstrated good return demonstration of  activities.    Assessment:   Pt tolerated treatment well reporting 0/10 pain to L knee post tx.  Pt reported pain to medial fat pad of L knee with lateral step downs, but pain subsided with manual therapy and patella mobs.  Pt able to complete lateral step down post manual therapy without L knee pain.  Pt will continue to benefit from skilled PT intervention to address the remaining functional deficits, maximize pt's level of independence in the home and community environment, as well as Continued inability to participate in vocational pursuits, Pain limits function of effected part for some activities, Unable to participate fully in daily activities and Weakness.    Patient is making good progress towards established goals.    Short Term Goals:  4 weeks  1. Pt will report decreased L knee pain to 3/10 in order to increase tolerance for standing/walking. - MET, but increases at end of day  2. Pt will increase L knee extension ROM to 2 deg hyperextension in order to show improved functional mobility. (met)  3. Pt will increase L knee flexion ROM by at least 5 degrees in order to show improved functional mobility. (progressing)  4. Increased L SLS balance to 5sec for increased ease with ambulation - (met)  5. Pt will be independent and consistent with issued HEP in order to show carryover between therapy sessions. (met)  6. Pt will ambulate 300 ft without AD independently without significant gait deviation (met)     Long Term Goals: 8 weeks       7. Pt will report decreased L knee pain to 1/10 in order to increase tolerance for standing/walking. -  MET but increases at end of day  8. Pt will report returning to working full shifts at [employer] without significant pain. - (in progress)  9. Pt will increase L knee flexion ROM to 135-140 degrees in order to show improved functional mobility.  10. Increased L SLS balance to 10sec for increased ease with ambulation (met)  11. Pt will ascend/descend 12 steps  without rails independently (progressing)   12. Pt will perform squat lift without pain to L knee (progressing)    Plan:  Pt to continue 1 time per week for 4 additional weeks (6/21/18) to address remaining ROM limitations and tissue tightness.

## 2018-06-14 ENCOUNTER — CLINICAL SUPPORT (OUTPATIENT)
Dept: REHABILITATION | Facility: HOSPITAL | Age: 53
End: 2018-06-14
Attending: ORTHOPAEDIC SURGERY
Payer: COMMERCIAL

## 2018-06-14 DIAGNOSIS — M62.81 MUSCLE WEAKNESS: ICD-10-CM

## 2018-06-14 DIAGNOSIS — M23.204 DEGENERATIVE TEAR OF MEDIAL MENISCUS OF LEFT KNEE: ICD-10-CM

## 2018-06-14 DIAGNOSIS — M25.562 ACUTE PAIN OF LEFT KNEE: ICD-10-CM

## 2018-06-14 PROCEDURE — 97140 MANUAL THERAPY 1/> REGIONS: CPT | Mod: PN

## 2018-06-14 PROCEDURE — 97110 THERAPEUTIC EXERCISES: CPT | Mod: PN

## 2018-06-14 NOTE — PROGRESS NOTES
[facility] Physical Therapy Daily Note    Name: Arlin Junior  Clinic Number: [id number]  Date of Treatment: 06/14/2018   Diagnosis:   Encounter Diagnoses   Name Primary?    Muscle weakness     Acute pain of left knee     Degenerative tear of medial meniscus of left knee      Start date: 3/22/2018  POC End date: 06/21/2018    Time in: 8:04  Time Out: 9:10  Total Treatment Time: 55  1:1 Time: 55  Visit number: 19/20    Precautions: L partial med menisectomy on 3/6/18    Subjective    Pt reports 2/10 pain to L knee, presently and 4-5/10 at night. States she still feels some swelling in the L knee with the pocked of swelling over the patella.     Objective    Arlin  Instructed in and perfomed therapeutic exercises to develop strength, endurance, ROM and flexibility for 30 minutes including:     Quad set in supine x 30 with 3 second hold  SL clams Y sport loop 3x10   Lateral step downs 6in step 2x10   Fwd step up with R hip/knee flexion 6in 2 x 10   Standing heel raises 3x10  Hip abd walk on striped walkway G sport loop 2 laps   Shuttle 4 bands 3x10 (G sport loop for hip abd iso)  2:1 shuttle 3 bands 3x10  Self patellar mobs into superior glide x 15  Mini squats 2x10 (cues to prevent knees over toes)    Pt received manual therapy techniques to L knee with IASTM along distal vastus lateralis and ITB as well as vacuum/cupping to same area for decreased myofascial tightness, improved circulation, and promote healing. STM to L medial fat pad and distal medial knee. The above performed for a total of 25 minutes    Patient received cold pack to L knee x 10 minutes post treatment for pain and edema.     Not performed  Stationary bike x 10 min, level 4, seat 6  pilates stepper (3 bands L3) 2x10 each (2 in step to reach pedal)    HEP provided: instruction on self superior patellar mobilization  Pt demo good understanding of the education provided. Arlin demonstrated good return demonstration of  "activities.    Assessment:   Arlin tolerated treatment well overall this date. She continues with mild weakness of L LE with deceleration and eccentric movements, including deep squats and eccentric step downs. Tightness of distal lateral ITB with manual techniques which improved some following IASTM and cupping. She reported 0-1/10 pain to L knee post tx. Mild "needle-point" pain reported at medial knee with lateral step downs, relieved with rest. Pt L LE strength is slowly improving.  Pt will continue to benefit from skilled PT intervention to address the remaining functional deficits, maximize pt's level of independence in the home and community environment, as well as Continued inability to participate in vocational pursuits, Pain limits function of effected part for some activities, Unable to participate fully in daily activities and Weakness.    Patient is making good progress towards established goals.    Short Term Goals:  4 weeks  1. Pt will report decreased L knee pain to 3/10 in order to increase tolerance for standing/walking. - MET, but increases at end of day  2. Pt will increase L knee extension ROM to 2 deg hyperextension in order to show improved functional mobility. (met)  3. Pt will increase L knee flexion ROM by at least 5 degrees in order to show improved functional mobility. (progressing)  4. Increased L SLS balance to 5sec for increased ease with ambulation - (met)  5. Pt will be independent and consistent with issued HEP in order to show carryover between therapy sessions. (met)  6. Pt will ambulate 300 ft without AD independently without significant gait deviation (met)     Long Term Goals: 8 weeks       7. Pt will report decreased L knee pain to 1/10 in order to increase tolerance for standing/walking. -  MET but increases at end of day  8. Pt will report returning to working full shifts at [employer] without significant pain. - (in progress)  9. Pt will increase L knee flexion ROM " to 135-140 degrees in order to show improved functional mobility.  10. Increased L SLS balance to 10sec for increased ease with ambulation (met)  11. Pt will ascend/descend 12 steps without rails independently (progressing)   12. Pt will perform squat lift without pain to L knee (progressing)    Plan:  Pt to continue 1 time per week for 4 additional weeks (6/21/18) to address remaining ROM limitations and tissue tightness.

## 2018-06-20 ENCOUNTER — CLINICAL SUPPORT (OUTPATIENT)
Dept: REHABILITATION | Facility: HOSPITAL | Age: 53
End: 2018-06-20
Payer: COMMERCIAL

## 2018-06-20 DIAGNOSIS — M25.562 ACUTE PAIN OF LEFT KNEE: ICD-10-CM

## 2018-06-20 DIAGNOSIS — M23.204 DEGENERATIVE TEAR OF MEDIAL MENISCUS OF LEFT KNEE: ICD-10-CM

## 2018-06-20 DIAGNOSIS — M62.81 MUSCLE WEAKNESS: ICD-10-CM

## 2018-06-20 PROCEDURE — 97110 THERAPEUTIC EXERCISES: CPT | Mod: PN

## 2018-06-20 NOTE — PLAN OF CARE
[facility] Outpatient Physical Therapy Daily Note    Name: Arlin Junior  Clinic Number: [id number]  Date of Treatment: 06/20/2018   Diagnosis:   Encounter Diagnoses   Name Primary?    Muscle weakness     Acute pain of left knee     Degenerative tear of medial meniscus of left knee      Start date: 3/22/2018  POC End date: 06/21/2018    Time in: 9:00  Time Out: 10:00  Total Treatment Time: 55  1:1 Time: 55  Visit number: 19/20    Precautions: L partial med menisectomy on 3/6/18    Subjective    Pt reports 2/10 pain to L knee, presently.  She states her L knee pain is worst at the end of her work shift, but the location of pain varies (HS, medial knee).    Objective    Knee Left active Right Active   Flexion 132  139   Extension 2 hyperext (5 hyperext post tx) 5 hyperext        Lower Extremity Strength  Right LE  Left LE    Knee extension: 5/5 Knee extension: 5/5   Knee flexion: 5/5 Knee flexion: 5/5   Hip flexion: 5/5 Hip flexion: 5/5   Hip extension:  5/5 Hip extension: 5/5   Hip abduction: 4+/5 Hip abduction: 3+/5 (4+/5 after MRE clams)   Hip adduction: 5/5 Hip adduction 5/5   Ankle dorsiflexion: 5/5 Ankle dorsiflexion: 5/5     Function:  - SLS R: 23 sec  - SLS L: 12 seconds (pain to medial knee), 16 sec    Arlin  Instructed in and perfomed therapeutic exercises to develop strength, endurance, ROM and flexibility for 55 minutes including:     Quad set in supine x 30 with 3 second hold  SL clams Y sport loop 3x10   Lateral step downs 6in step 2x10   Fwd step up with R hip/knee flexion 6in 2 x 10   Standing heel raises 3x10  Hip abd walk on striped walkway G sport loop 2 laps   Shuttle 4 bands 3x10 (G sport loop for hip abd iso)  2:1 shuttle 3 bands 3x10  Self patellar mobs into superior glide x 15  Mini squats with toes against wall 2x10 (vc to avoid knee valgus)    Patient received cold pack to L knee x 10 minutes post treatment for pain and edema.     Not performed  Stationary bike x 10 min, level  4, seat 6  pilates stepper (3 bands L3) 2x10 each (2 in step to reach pedal)    HEP provided: pt instructed to continue previous HEP.  Pt demo good understanding of the education provided. Arlin demonstrated good return demonstration of activities.  Pt instructed to purchase shoes with better arch support to prevent severe pronation and improve biomechanics at the knee.  Pt gave verbal understanding.      Assessment:   Arlin tolerated tx session well reporting 0/10 pain to L knee post tx.  She continues to improve with increased LE strength, but still some weakness with hip abd/glute medius. She required occasional cues to avoid L knee valgus with closed chain ex.  Pt presents with B foot pronation which may be contributing to valgus at knees.  Pt instructed to purchase shoes with better arch support to prevent severe pronation and improve biomechanics at the knee.  Pt will continue to benefit from skilled PT intervention to address the remaining functional deficits, maximize pt's level of independence in the home and community environment, as well as Continued inability to participate in vocational pursuits, Pain limits function of effected part for some activities, Unable to participate fully in daily activities and Weakness.     Patient is making good progress towards established goals.    Short Term Goals:  4 weeks  1. Pt will report decreased L knee pain to 3/10 in order to increase tolerance for standing/walking. - MET, but increases at end of day  2. Pt will increase L knee extension ROM to 2 deg hyperextension in order to show improved functional mobility. (met)  3. Pt will increase L knee flexion ROM by at least 5 degrees in order to show improved functional mobility. (progressing)  4. Increased L SLS balance to 5sec for increased ease with ambulation - (met)  5. Pt will be independent and consistent with issued HEP in order to show carryover between therapy sessions. (met)  6. Pt will ambulate 300 ft without  AD independently without significant gait deviation (met)     Long Term Goals: 8 weeks       7. Pt will report decreased L knee pain to 1/10 in order to increase tolerance for standing/walking. -  MET but increases at end of day  8. Pt will report returning to working full shifts at [employer] without significant pain. - (in progress)  9. Pt will increase L knee flexion ROM to 135-140 degrees in order to show improved functional mobility. (progressing)  10. Increased L SLS balance to 10sec for increased ease with ambulation (met)  11. Pt will ascend/descend 12 steps without rails independently (progressing)   12. Pt will perform squat lift without pain to L knee (met)    Unmet goals continue to remain appropriate within 4 weeks.    Plan:  Pt to continue 1 time per week for 4 additional weeks (7/20/18) to address remaining ROM limitations and tissue tightness.   Pt will be treated by physical therapy 1 times a week for 4 weeks for Pt Education, HEP, therapeutic exercises, neuromuscular re-education, joint mobilizations, dry needling, modalities prn to achieve established goals. Arlin may at times be seen by a PTA as part of the Rehab Team.     Cont PT for  4  weeks.         I certify the need for these services furnished under this plan of treatment and while under my care.______________________________ Physician/Referring Practitioner  Date of Signature    PT/PTA met face to face to discuss patient's treatment plan and progress towards established goals.  Treatment will be continued as described in initial report/eval and progress notes.  Patient will be seen by physical therapist every sixth visit and minimally once per month.

## 2018-06-20 NOTE — PROGRESS NOTES
[facility] Outpatient Physical Therapy Daily Note    Name: Arlin Junior  Clinic Number: [id number]  Date of Treatment: 06/20/2018   Diagnosis:   Encounter Diagnoses   Name Primary?    Muscle weakness     Acute pain of left knee     Degenerative tear of medial meniscus of left knee      Start date: 3/22/2018  POC End date: 06/21/2018    Time in: 9:00  Time Out: 10:00  Total Treatment Time: 55  1:1 Time: 55  Visit number: 19/20    Precautions: L partial med menisectomy on 3/6/18    Subjective    Pt reports 2/10 pain to L knee, presently.  She states her L knee pain is worst at the end of her work shift, but the location of pain varies (HS, medial knee).    Objective    Knee Left active Right Active   Flexion 132  139   Extension 2 hyperext (5 hyperext post tx) 5 hyperext        Lower Extremity Strength  Right LE  Left LE    Knee extension: 5/5 Knee extension: 5/5   Knee flexion: 5/5 Knee flexion: 5/5   Hip flexion: 5/5 Hip flexion: 5/5   Hip extension:  5/5 Hip extension: 5/5   Hip abduction: 4+/5 Hip abduction: 3+/5 (4+/5 after MRE clams)   Hip adduction: 5/5 Hip adduction 5/5   Ankle dorsiflexion: 5/5 Ankle dorsiflexion: 5/5     Function:  - SLS R: 23 sec  - SLS L: 12 seconds (pain to medial knee), 16 sec    Arlin  Instructed in and perfomed therapeutic exercises to develop strength, endurance, ROM and flexibility for 55 minutes including:     Quad set in supine x 30 with 3 second hold  SL clams Y sport loop 3x10   Lateral step downs 6in step 2x10   Fwd step up with R hip/knee flexion 6in 2 x 10   Standing heel raises 3x10  Hip abd walk on striped walkway G sport loop 2 laps   Shuttle 4 bands 3x10 (G sport loop for hip abd iso)  2:1 shuttle 3 bands 3x10  Self patellar mobs into superior glide x 15  Mini squats with toes against wall 2x10 (vc to avoid knee valgus)    Patient received cold pack to L knee x 10 minutes post treatment for pain and edema.     Not performed  Stationary bike x 10 min, level  4, seat 6  pilates stepper (3 bands L3) 2x10 each (2 in step to reach pedal)    HEP provided: pt instructed to continue previous HEP.  Pt demo good understanding of the education provided. Arlin demonstrated good return demonstration of activities.  Pt instructed to purchase shoes with better arch support to prevent severe pronation and improve biomechanics at the knee.  Pt gave verbal understanding.      Assessment:   Arlin tolerated tx session well reporting 0/10 pain to L knee post tx.  She continues to improve with increased LE strength, but still some weakness with hip abd/glute medius. She required occasional cues to avoid L knee valgus with closed chain ex.  Pt presents with B foot pronation which may be contributing to valgus at knees.  Pt instructed to purchase shoes with better arch support to prevent severe pronation and improve biomechanics at the knee.  Pt will continue to benefit from skilled PT intervention to address the remaining functional deficits, maximize pt's level of independence in the home and community environment, as well as Continued inability to participate in vocational pursuits, Pain limits function of effected part for some activities, Unable to participate fully in daily activities and Weakness.     Patient is making good progress towards established goals.    Short Term Goals:  4 weeks  1. Pt will report decreased L knee pain to 3/10 in order to increase tolerance for standing/walking. - MET, but increases at end of day  2. Pt will increase L knee extension ROM to 2 deg hyperextension in order to show improved functional mobility. (met)  3. Pt will increase L knee flexion ROM by at least 5 degrees in order to show improved functional mobility. (progressing)  4. Increased L SLS balance to 5sec for increased ease with ambulation - (met)  5. Pt will be independent and consistent with issued HEP in order to show carryover between therapy sessions. (met)  6. Pt will ambulate 300 ft without  AD independently without significant gait deviation (met)     Long Term Goals: 8 weeks       7. Pt will report decreased L knee pain to 1/10 in order to increase tolerance for standing/walking. -  MET but increases at end of day  8. Pt will report returning to working full shifts at [employer] without significant pain. - (in progress)  9. Pt will increase L knee flexion ROM to 135-140 degrees in order to show improved functional mobility. (progressing)  10. Increased L SLS balance to 10sec for increased ease with ambulation (met)  11. Pt will ascend/descend 12 steps without rails independently (progressing)   12. Pt will perform squat lift without pain to L knee (met)    Unmet goals continue to remain appropriate within 4 weeks.    Plan:  Pt to continue 1 time per week for 4 additional weeks (7/20/18) to address remaining ROM limitations and tissue tightness.   Pt will be treated by physical therapy 1 times a week for 4 weeks for Pt Education, HEP, therapeutic exercises, neuromuscular re-education, joint mobilizations, dry needling, modalities prn to achieve established goals. Arlin may at times be seen by a PTA as part of the Rehab Team.     Cont PT for  4  weeks.         I certify the need for these services furnished under this plan of treatment and while under my care.______________________________ Physician/Referring Practitioner  Date of Signature    PT/PTA met face to face to discuss patient's treatment plan and progress towards established goals.  Treatment will be continued as described in initial report/eval and progress notes.  Patient will be seen by physical therapist every sixth visit and minimally once per month.

## 2020-12-14 ENCOUNTER — LAB VISIT (OUTPATIENT)
Dept: FAMILY MEDICINE | Facility: CLINIC | Age: 55
End: 2020-12-14
Payer: COMMERCIAL

## 2020-12-14 DIAGNOSIS — M17.12 PRIMARY OSTEOARTHRITIS OF LEFT KNEE: ICD-10-CM

## 2020-12-14 DIAGNOSIS — Z01.818 PREOP EXAMINATION: ICD-10-CM

## 2020-12-14 PROCEDURE — U0003 INFECTIOUS AGENT DETECTION BY NUCLEIC ACID (DNA OR RNA); SEVERE ACUTE RESPIRATORY SYNDROME CORONAVIRUS 2 (SARS-COV-2) (CORONAVIRUS DISEASE [COVID-19]), AMPLIFIED PROBE TECHNIQUE, MAKING USE OF HIGH THROUGHPUT TECHNOLOGIES AS DESCRIBED BY CMS-2020-01-R: HCPCS

## 2020-12-15 LAB — SARS-COV-2 RNA RESP QL NAA+PROBE: NOT DETECTED

## 2020-12-17 PROBLEM — M17.12 PRIMARY OSTEOARTHRITIS OF LEFT KNEE: Status: ACTIVE | Noted: 2020-12-17

## 2021-05-10 ENCOUNTER — PATIENT MESSAGE (OUTPATIENT)
Dept: RESEARCH | Facility: HOSPITAL | Age: 56
End: 2021-05-10